# Patient Record
Sex: FEMALE | Race: OTHER | URBAN - METROPOLITAN AREA
[De-identification: names, ages, dates, MRNs, and addresses within clinical notes are randomized per-mention and may not be internally consistent; named-entity substitution may affect disease eponyms.]

---

## 2019-04-25 ENCOUNTER — OFFICE VISIT (OUTPATIENT)
Dept: FAMILY MEDICINE CLINIC | Age: 29
End: 2019-04-25

## 2019-04-25 ENCOUNTER — HOSPITAL ENCOUNTER (OUTPATIENT)
Dept: LAB | Age: 29
Discharge: HOME | End: 2019-04-25

## 2019-04-25 VITALS
SYSTOLIC BLOOD PRESSURE: 112 MMHG | BODY MASS INDEX: 22.97 KG/M2 | HEART RATE: 73 BPM | TEMPERATURE: 98 F | WEIGHT: 117 LBS | HEIGHT: 60 IN | DIASTOLIC BLOOD PRESSURE: 69 MMHG

## 2019-04-25 DIAGNOSIS — R42 DIZZINESS: Primary | ICD-10-CM

## 2019-04-25 DIAGNOSIS — R42 DIZZINESS: ICD-10-CM

## 2019-04-25 LAB
BASOPHILS # BLD: 0 K/UL (ref 0–0.1)
BASOPHILS NFR BLD: 0 % (ref 0–1)
DIFFERENTIAL METHOD BLD: NORMAL
EOSINOPHIL # BLD: 0.2 K/UL (ref 0–0.4)
EOSINOPHIL NFR BLD: 4 % (ref 0–7)
ERYTHROCYTE [DISTWIDTH] IN BLOOD BY AUTOMATED COUNT: 12.8 % (ref 11.5–14.5)
HCT VFR BLD AUTO: 41.9 % (ref 35–47)
HGB BLD-MCNC: 13.9 G/DL (ref 11.5–16)
HGB BLD-MCNC: 14.1 G/DL
IMM GRANULOCYTES # BLD AUTO: 0 K/UL (ref 0–0.04)
IMM GRANULOCYTES NFR BLD AUTO: 0 % (ref 0–0.5)
LYMPHOCYTES # BLD: 1.6 K/UL (ref 0.8–3.5)
LYMPHOCYTES NFR BLD: 28 % (ref 12–49)
MCH RBC QN AUTO: 28.8 PG (ref 26–34)
MCHC RBC AUTO-ENTMCNC: 33.2 G/DL (ref 30–36.5)
MCV RBC AUTO: 86.7 FL (ref 80–99)
MONOCYTES # BLD: 0.5 K/UL (ref 0–1)
MONOCYTES NFR BLD: 9 % (ref 5–13)
NEUTS SEG # BLD: 3.4 K/UL (ref 1.8–8)
NEUTS SEG NFR BLD: 59 % (ref 32–75)
NRBC # BLD: 0 K/UL (ref 0–0.01)
NRBC BLD-RTO: 0 PER 100 WBC
PLATELET # BLD AUTO: 211 K/UL (ref 150–400)
PMV BLD AUTO: 12.3 FL (ref 8.9–12.9)
RBC # BLD AUTO: 4.83 M/UL (ref 3.8–5.2)
WBC # BLD AUTO: 5.8 K/UL (ref 3.6–11)

## 2019-04-25 PROCEDURE — 80053 COMPREHEN METABOLIC PANEL: CPT

## 2019-04-25 PROCEDURE — 82306 VITAMIN D 25 HYDROXY: CPT

## 2019-04-25 PROCEDURE — 85025 COMPLETE CBC W/AUTO DIFF WBC: CPT

## 2019-04-25 PROCEDURE — 84443 ASSAY THYROID STIM HORMONE: CPT

## 2019-04-25 NOTE — PROGRESS NOTES
HISTORY OF PRESENT ILLNESS  Jonelle Turcios is a 29 y.o. female. HPI  Patient has been having dizzy spells for the past 2 months. Menstrual cycles are normal.    Has noticeds some sore throat, started yesterday. She is breastfeeding. Takes no medication. Denies problems with pollen. States the dizziness is not often, but has happened at least 3 times in the past 2 weeks and is for short periods of time when it happens    Review of Systems   Constitutional: Negative for chills, fever and weight loss. HENT: Positive for sore throat. Negative for ear pain, hearing loss and tinnitus. Eyes: Negative for blurred vision, double vision and photophobia. Respiratory: Negative for cough, shortness of breath and wheezing. Cardiovascular: Negative for chest pain and leg swelling. Gastrointestinal: Negative for abdominal pain, constipation, diarrhea, heartburn, nausea and vomiting. Genitourinary: Negative for dysuria, frequency and urgency. Musculoskeletal: Negative for back pain, myalgias and neck pain. Skin: Negative for itching and rash. Neurological: Positive for dizziness. /69 (BP 1 Location: Right arm, BP Patient Position: Sitting)   Pulse 73   Temp 98 °F (36.7 °C) (Oral)   Ht 4' 11.84\" (1.52 m)   Wt 117 lb (53.1 kg)   LMP 04/04/2019   BMI 22.97 kg/m²   Physical Exam   Constitutional: She is oriented to person, place, and time. She appears well-developed and well-nourished. HENT:   Head: Normocephalic. Right Ear: External ear normal.   Left Ear: External ear normal.   Throat erythema no exudates   Eyes: Pupils are equal, round, and reactive to light. Conjunctivae and EOM are normal.   Neck: Normal range of motion. Neck supple. Cardiovascular: Normal rate, regular rhythm and normal heart sounds. No murmur heard. Pulmonary/Chest: Effort normal and breath sounds normal. No respiratory distress. She has no wheezes. She has no rales. Abdominal: Soft.  Bowel sounds are normal. She exhibits no distension. There is no tenderness. There is no rebound. Musculoskeletal: Normal range of motion. She exhibits no edema. Neurological: She is alert and oriented to person, place, and time. She has normal reflexes. Skin: Skin is warm. No erythema. ASSESSMENT and PLAN  Diagnoses and all orders for this visit:    1. Dizziness  -     AMB POC HEMOGLOBIN (HGB)  -     CBC WITH AUTOMATED DIFF; Future  -     METABOLIC PANEL, COMPREHENSIVE; Future  -     TSH 3RD GENERATION; Future  -     VITAMIN D, 25 HYDROXY; Future      Dizziness, unclear reason, will check labs today.   Advised to stay well hydrated

## 2019-04-25 NOTE — PROGRESS NOTES
Results for orders placed or performed in visit on 04/25/19   AMB POC HEMOGLOBIN (HGB)   Result Value Ref Range    Hemoglobin (POC) 14.1

## 2019-04-26 LAB
25(OH)D3 SERPL-MCNC: 27.3 NG/ML (ref 30–100)
ALBUMIN SERPL-MCNC: 4.1 G/DL (ref 3.5–5)
ALBUMIN/GLOB SERPL: 1.3 {RATIO} (ref 1.1–2.2)
ALP SERPL-CCNC: 89 U/L (ref 45–117)
ALT SERPL-CCNC: 29 U/L (ref 12–78)
ANION GAP SERPL CALC-SCNC: 6 MMOL/L (ref 5–15)
AST SERPL-CCNC: 19 U/L (ref 15–37)
BILIRUB SERPL-MCNC: 0.5 MG/DL (ref 0.2–1)
BUN SERPL-MCNC: 10 MG/DL (ref 6–20)
BUN/CREAT SERPL: 16 (ref 12–20)
CALCIUM SERPL-MCNC: 8.9 MG/DL (ref 8.5–10.1)
CHLORIDE SERPL-SCNC: 107 MMOL/L (ref 97–108)
CO2 SERPL-SCNC: 27 MMOL/L (ref 21–32)
CREAT SERPL-MCNC: 0.63 MG/DL (ref 0.55–1.02)
GLOBULIN SER CALC-MCNC: 3.2 G/DL (ref 2–4)
GLUCOSE SERPL-MCNC: 75 MG/DL (ref 65–100)
POTASSIUM SERPL-SCNC: 3.9 MMOL/L (ref 3.5–5.1)
PROT SERPL-MCNC: 7.3 G/DL (ref 6.4–8.2)
SODIUM SERPL-SCNC: 140 MMOL/L (ref 136–145)
TSH SERPL DL<=0.05 MIU/L-ACNC: 1.2 UIU/ML (ref 0.36–3.74)

## 2019-04-29 NOTE — PROGRESS NOTES
Normal blood count, kidney function, liver function test and electrolytes, normal thyroid function. Vitamin D level is a little decreased.   She needs to get OTC vitamin D supplement and take about 1200 International Units a day  Patient can be informed

## 2019-07-30 ENCOUNTER — OFFICE VISIT (OUTPATIENT)
Dept: FAMILY MEDICINE CLINIC | Age: 29
End: 2019-07-30

## 2019-07-30 VITALS — WEIGHT: 127.4 LBS | TEMPERATURE: 98.1 F | BODY MASS INDEX: 25.01 KG/M2

## 2019-07-30 DIAGNOSIS — R21 RASH AND NONSPECIFIC SKIN ERUPTION: Primary | ICD-10-CM

## 2019-07-30 DIAGNOSIS — J02.9 SORE THROAT: ICD-10-CM

## 2019-07-30 RX ORDER — NAPROXEN 500 MG/1
500 TABLET ORAL 2 TIMES DAILY WITH MEALS
Qty: 10 TAB | Refills: 0 | Status: SHIPPED | OUTPATIENT
Start: 2019-07-30 | End: 2019-07-30

## 2019-07-30 RX ORDER — LORATADINE 10 MG/1
10 TABLET ORAL DAILY
Qty: 30 TAB | Refills: 0 | Status: SHIPPED | OUTPATIENT
Start: 2019-07-30

## 2019-07-30 NOTE — PROGRESS NOTES
Patient is breastfeeding-naproxen 500mg tablet rx was discontinue. Nurse called [address]  spoke with Dede Mobley and medication was verbally discontinue. Patient prescribed OTC Ibuprofen 200mg tablet, one tablet by mouth [id number] PRN. Reviewed AVS, prescription and pharmacy location with patient (loratidine). AVS printed and given. Patient to RTC if s/s worsen or fail to improve. This has been fully explained to the patient, who indicates understanding and agrees with plan. No further questions at this time.  Jennifer Alamo RN

## 2019-07-30 NOTE — PROGRESS NOTES
Assessment/Plan:       ICD-10-CM ICD-9-CM    1. Rash and nonspecific skin eruption R21 782.1 loratadine (CLARITIN) 10 mg tablet         [address], [address]  [address] [facility] [address]  Phone: [phone] Fax: [phone]      [facility]-  [address]  Subjective:     Chief Complaint   Patient presents with    Sore Throat     Pt explains that sore throat is very deep mostly in the morning and not when she swallows. Back + and chest pressure.  Rash     On legs x15 days. Very itchy. Opal Mitchell is a 29 y.o. OTHER female. HPI:   Sore throat - in the mornings. No problems swallowing liquids and solids. Also pain behind the shoulder. Took simethicone and loperamide to eliminate gases from  store. Feels dry mouty. How long? Associated chest pressure. Rash on the legs for 15 days, itchy. It is worse. No new creams or products. Started Vitamin D prescription a month ago. No hot tub. She used alcohol on the rash and it didn't help. Using an anti-astringent cream that is steroid free from [facility] containing aluminum acetate astringent providing temporary relief. She  has no past medical history on file. Review of Systems: Negative for: fever, chest pain, shortness of breath, leg swelling, exertional dyspnea, palpitations. Current Medications:   No current outpatient medications on file prior to visit. No current facility-administered medications on file prior to visit. Social History: She  reports that she has never smoked. She has never used smokeless tobacco. She reports that she drank alcohol. Objective:     Vitals:    07/30/19 1339   Temp: 98.1 °F (36.7 °C)   TempSrc: Oral   Weight: 127 lb 6.4 oz (57.8 kg)    No LMP recorded. Wt Readings from Last 2 Encounters:   07/30/19 127 lb 6.4 oz (57.8 kg)   04/25/19 117 lb (53.1 kg)   Weight gain noted.   No results found for any visits on 07/30/19. LMP July 8 - July 14. Physical Examination:  General appearance - well developed, no acute distress. Chest - clear to auscultation. Heart - regular rate and rhythm without murmurs, rubs, or gallops. Abdomen - bowel sounds present x 4, NT, ND. Extremities - pulses intact. No peripheral edema. Assessment/Plan:   Diagnoses and all orders for this visit:    1. Rash and nonspecific skin eruption  -     loratadine (CLARITIN) 10 mg tablet; Take 1 Tab by mouth daily. For itching; Yana 1 tab diario para picazon      She sleeps fine but occasionally the itching is too much at night. Kellie Rondon, NAVYA, FNP-BC, BC-ADM  Radha Bergeron expressed understanding of this plan.

## 2019-07-30 NOTE — PROGRESS NOTES
Coordination of Care  1. Have you been to the ER, urgent care clinic since your last visit? Hospitalized since your last visit? No    2. Have you seen or consulted any other health care providers outside of the [address] [place] [address] since your last visit? Include any pap smears or colon screening. No    Does the patient need refills? NO    Learning Assessment Complete?  yes  Depression Screening complete in the past 12 months? yes

## 2020-05-12 ENCOUNTER — OFFICE VISIT (OUTPATIENT)
Dept: FAMILY MEDICINE CLINIC | Age: 30
End: 2020-05-12

## 2020-05-12 DIAGNOSIS — R20.0 NUMBNESS AND TINGLING IN RIGHT HAND: Primary | ICD-10-CM

## 2020-05-12 DIAGNOSIS — R20.2 NUMBNESS AND TINGLING IN RIGHT HAND: Primary | ICD-10-CM

## 2020-05-12 RX ORDER — AMITRIPTYLINE HYDROCHLORIDE 10 MG/1
10 TABLET, FILM COATED ORAL
Qty: 7 TAB | Refills: 0 | Status: SHIPPED | OUTPATIENT
Start: 2020-05-12

## 2020-05-12 RX ORDER — NAPROXEN 500 MG/1
500 TABLET ORAL 2 TIMES DAILY WITH MEALS
Qty: 14 TAB | Refills: 0 | Status: SHIPPED | OUTPATIENT
Start: 2020-05-12

## 2020-05-12 NOTE — PROGRESS NOTES
Coordination of Care  1. Have you been to the ER, urgent care clinic since your last visit? Hospitalized since your last visit? No    2. Have you seen or consulted any other health care providers outside of the [address] [place] [address] since your last visit? Include any pap smears or colon screening. No    Does the patient need refills? N/A    Learning Assessment Complete?  yes

## 2020-05-12 NOTE — PROGRESS NOTES
Pat Suresh is a 34 y.o. female evaluated via telephone at [phone] on 5/12/2020. Patient identification verified with 2 identifiers. Consent: She and/or health care decision maker has provided verbal consent to proceed: Yes Pursuant to the emergency declaration under the [address] [facility], [address] [place] [address] and the [employer] and [employer] Act, this Virtual Telephone Visit was conducted to reduce the patient's risk of exposure to COVID-19. : Mark Tony  Chief Complaint   Patient presents with    Numbness     right hand     15 days since right hand numb. Middle and ring finger are numb all the time, day and night. Tingling. Wakes her up at night. 8 days ago the right hand woke up again but the 2 fingers in the middle are still numb. 20 days ago she scratched her wrist on a piece of plastic while taking food out of the freezer. Reports it has healed. It did not hurt at the time. States that her  strength in this hand is normal and she is not dropping items she picks up. Numbness   The history is provided by the patient. This is a recurrent (had this last summer but she would wake up to numb fingers) problem. The current episode started more than 1 week ago. The problem has not changed since onset. There was right upper extremity (right hand) focality noted. There has been no fever. There were no medications administered (has tried exercises but they don't help) prior to arrival.    Review of Systems   Neurological: Positive for numbness. Patient's last menstrual period was 04/27/2020. Documentation:  I communicated with the patient and/or health care decision maker about:  Diagnoses and all orders for this visit:    1. Numbness and tingling in right hand  -     naproxen (NAPROSYN) 500 mg tablet; Take 1 Tab by mouth two (2) times daily (with meals). For inflammation.   Yana 1 tab por boca 2 veces al mira con comida para inflamacion  -     amitriptyline (ELAVIL) 10 mg tablet; Take 1 Tab by mouth nightly. For numbness. Yana 1 tab antes de dormir para dormecido. Follow-up and Dispositions    · Return if symptoms worsen or fail to improve. Details of this discussion including any medical advice provided: Naproxen 500 mg bid and amitriptyline 10 mg po qhs for 7 days  Total Time: minutes: 11-20 minutes  I affirm this is a Patient Initiated Episode with a Patient who has not had a related appointment within my department in the past 7 days or scheduled within the next 24 hours.   Matt Wong, NP

## 2020-12-04 ENCOUNTER — VIRTUAL VISIT (OUTPATIENT)
Dept: FAMILY MEDICINE CLINIC | Age: 30
End: 2020-12-04

## 2020-12-04 DIAGNOSIS — Z30.09 ENCOUNTER FOR COUNSELING REGARDING CONTRACEPTION: Primary | ICD-10-CM

## 2020-12-04 PROCEDURE — 99441 PR PHYS/QHP TELEPHONE EVALUATION 5-10 MIN: CPT | Performed by: NURSE PRACTITIONER

## 2020-12-04 NOTE — PROGRESS NOTES
: Keke Sanchez  Patient identification verified with 2 identifiers. Consent: She and/or health care decision maker has provided verbal consent to proceed: Yes   Total Time: minutes: 5-10 minutes  Pursuant to the emergency declaration under the [address] [facility], [address] [place] [address] authority and the [employer] and [employer] Act, this Virtual Telephone Visit was conducted to reduce the patient's risk of exposure to COVID-19. Assessment:   Diagnoses and all orders for this visit:    1. Encounter for counseling regarding contraception      Plan:  I communicated with the patient and/or health care decision maker about the following:    -Will provide information on available resources for contraception    Nanci Monzon is a 27 y.o. female evaluated via telephone on 2020. Patient identification verified with 2 identifiers. Chief Complaint   Patient presents with   [address] [place] [address] Other     birth control questions     History of Present Illness  The patient has an implant that has (20) but she wants to continue with family planning  Three years ago in [facility] she had a contraceptive implant placed under her left arm called \"Implanto LXP\". The closest similar product available in the [facility] would be Nexplanon. Currently having the same symptoms she has had since the start of the contraceptive implant, including heavy bleeding and lower abdominal pain. Concerned because it is  and would like to continue contraception. No physical exam performed due to telephone visit. I affirm this is a Patient Initiated Episode with a Patient who has not had a related appointment within my department in the past 7 days or scheduled within the next 24 hours. Alonso Newman NP   Nanci Monzon expressed understanding and agreed to this plan.

## 2020-12-04 NOTE — PROGRESS NOTES
With the assistance of Jaylan Guzman, [facility] , gave the patient the [facility] Health Department of the [facility] Department number, [phone]. Instructed patient to speak clearly and slowly and leave her name and number in a voicemail. Pt also given the English name of her birth control implant, \"Nexplanon\". Pt verbalized understanding, denied having any further questions. Alexis Herrera.  [facility]

## 2020-12-04 NOTE — PROGRESS NOTES
Coordination of Care  1. Have you been to the ER, urgent care clinic since your last visit? Hospitalized since your last visit? No    2. Have you seen or consulted any other health care providers outside of the [address] [place] [address] since your last visit? Include any pap smears or colon screening. No    Does the patient need refills? NO    Learning Assessment Complete?  yes  Depression Screening complete in the past 12 months? yes     Pt has not vitals signs available    Pt is taking Vit D    The patient has an implant that has (20) but she wants to continue with family planning

## 2023-05-25 RX ORDER — LORATADINE 10 MG/1
10 TABLET ORAL DAILY
COMMUNITY
Start: 2019-07-30 | End: 2023-07-03

## 2023-05-25 RX ORDER — NAPROXEN 500 MG/1
500 TABLET ORAL 2 TIMES DAILY WITH MEALS
COMMUNITY
Start: 2020-05-12 | End: 2023-07-03

## 2023-05-25 RX ORDER — AMITRIPTYLINE HYDROCHLORIDE 10 MG/1
10 TABLET, FILM COATED ORAL
COMMUNITY
Start: 2020-05-12 | End: 2023-07-03

## 2023-07-24 ENCOUNTER — INITIAL PRENATAL (OUTPATIENT)
Age: 33
End: 2023-07-24

## 2023-07-24 VITALS
DIASTOLIC BLOOD PRESSURE: 71 MMHG | WEIGHT: 128.6 LBS | HEIGHT: 60 IN | BODY MASS INDEX: 25.25 KG/M2 | SYSTOLIC BLOOD PRESSURE: 119 MMHG

## 2023-07-24 DIAGNOSIS — Z78.9 LANGUAGE BARRIER: ICD-10-CM

## 2023-07-24 DIAGNOSIS — Z3A.09 9 WEEKS GESTATION OF PREGNANCY: Primary | ICD-10-CM

## 2023-07-24 DIAGNOSIS — Z01.419 ENCOUNTER FOR CERVICAL PAP SMEAR WITH PELVIC EXAM: ICD-10-CM

## 2023-07-24 PROBLEM — Z60.3 LANGUAGE BARRIER: Status: ACTIVE | Noted: 2023-07-24

## 2023-07-24 PROBLEM — Z75.8 LANGUAGE BARRIER: Status: ACTIVE | Noted: 2023-07-24

## 2023-07-24 PROCEDURE — 0502F SUBSEQUENT PRENATAL CARE: CPT | Performed by: OBSTETRICS & GYNECOLOGY

## 2023-07-24 NOTE — PROGRESS NOTES
Current pregnancy history:    Krys Ballesteros is a ,  28 y.o. female  / Mona Gandhis Patient's last menstrual period was 2023 (exact date). .  She presents for the evaluation of amenorrhea and a positive pregnancy test.    LMP history:  The date of her LMP is 8900 certain. Her last menstrual period was normal and lasted for 4 to 5 days. A urine pregnancy test was positive 3 weeks ago. She was not on the pill at conception. Based on her LMP, her EDC is 2024 and her EGA is 9 weeks,4 days. Her menstrual cycles are regular and occur approximately every 28 days and range from 3 to 5 days. The last menses lasted  the usual number of days. Ultrasound data:  She had an ultrasound done by the ultrasound tech today which revealed a viable turk pregnancy with a gestational age of 10 weeks and 1 days giving an [address] of 2024. TA ULTRASOUND PERFORMED  A SINGLE VIABLE 9W1D IUP IS SEEN WITH NORMAL CARDIAC RHYTHM. GESTATIONAL AGE BASED ON TODAY'S ULTRASOUND. A NORMAL YOLK SAC IS SEEN. RIGHT ADNEXA APPEARS WITHIN NORMAL LIMITS. LEFT ADNEXA APPEARS WITHIN NORMAL LIMITS. NO FREE FLUID SEEN IN THE CDS. Pregnancy symptoms:     Since her LMP she has experienced urinary frequency, breast tenderness, and nausea. She has not been vomiting over the last few weeks. Associated signs and symptoms which she denies: dysuria, discharge, vaginal bleeding. She states she has gained weight:  not sure how much over the last few weeks. Relevant past pregnancy history:              She has the following pregnancy history:                She has no history of  delivery. Relevant past medical history:(relevant to this pregnancy): noncontributory. Her occupation is: homemaker. Substance history: negative for alcohol, tobacco and street drugs. Positive for nothing. Exposure history: There is/are no indoor cat/s in the home.   The patient was instructed to

## 2023-07-24 NOTE — PROGRESS NOTES
Naye Cordero is a 28 y.o. female presents for a new pregnancy visit. Chief Complaint   Patient presents with    Initial Prenatal Visit          Patient's last menstrual period was 2023 (exact date). Last Pap: not  obtained. LMP history:  The date of her LMP is  certain. Her last menstrual period was normal and lasted for 4 to 5 days. A urine pregnancy test was positive 3 weeks ago. She was not on the pill at conception. Based on her LMP, her EDC is 2024 and her EGA is 9 weeks,4 days. Her menstrual cycles are regular and occur approximately every 28 days and range from 3 to 5 days. The last menses lasted  the usual number of days. Ultrasound data:  She had an ultrasound done by the ultrasound tech today which revealed a viable turk pregnancy with a gestational age of 10 weeks and 1 days giving an [address] of 2024. TA ULTRASOUND PERFORMED  A SINGLE VIABLE 9W1D IUP IS SEEN WITH NORMAL CARDIAC RHYTHM. GESTATIONAL AGE BASED ON TODAY'S ULTRASOUND. A NORMAL YOLK SAC IS SEEN. RIGHT ADNEXA APPEARS WITHIN NORMAL LIMITS. LEFT ADNEXA APPEARS WITHIN NORMAL LIMITS. NO FREE FLUID SEEN IN THE CDS. Pregnancy symptoms:    Since her LMP she has experienced urinary frequency, breast tenderness, and nausea. She has not been vomiting over the last few weeks. Associated signs and symptoms which she denies: dysuria, discharge, vaginal bleeding. She states she has gained weight:  Approximately 5 pounds over the last few weeks. Relevant past pregnancy history:   She has the following pregnancy history:     She has no history of  delivery. Relevant past medical history:(relevant to this pregnancy): noncontributory. Her occupation is: homemaker. 1. Have you been to the ER, urgent care clinic, or hospitalized since your last visit? N/A NP    2.  Have you seen or consulted any other health care providers outside of the [address] [place] [address] Avenue since your

## 2023-07-25 LAB
25(OH)D3+25(OH)D2 SERPL-MCNC: 30.4 NG/ML (ref 30–100)
ABO GROUP BLD: NORMAL
ABO, EXTERNAL RESULT: NORMAL
BLD GP AB SCN SERPL QL: NEGATIVE
ERYTHROCYTE [DISTWIDTH] IN BLOOD BY AUTOMATED COUNT: 13.2 % (ref 11.7–15.4)
HBA1C MFR BLD: 5.1 % (ref 4.8–5.6)
HBV SURFACE AG SERPL QL IA: NEGATIVE
HCT VFR BLD AUTO: 40.2 % (ref 34–46.6)
HCV IGG SERPL QL IA: NON REACTIVE
HEP B, EXTERNAL RESULT: NEGATIVE
HEPATITIS C ANTIBODY, EXTERNAL RESULT: NEGATIVE
HGB BLD-MCNC: 13.6 G/DL (ref 11.1–15.9)
HIV 1+2 AB+HIV1 P24 AG SERPL QL IA: NON REACTIVE
HIV, EXTERNAL RESULT: NEGATIVE
MCH RBC QN AUTO: 28.2 PG (ref 26.6–33)
MCHC RBC AUTO-ENTMCNC: 33.8 G/DL (ref 31.5–35.7)
MCV RBC AUTO: 83 FL (ref 79–97)
PLATELET # BLD AUTO: 252 X10E3/UL (ref 150–450)
RBC # BLD AUTO: 4.82 X10E6/UL (ref 3.77–5.28)
RH BLD: POSITIVE
RPR SER QL: NON REACTIVE
RPR, EXTERNAL RESULT: NON REACTIVE
RUBELLA TITER, EXTERNAL RESULT: NORMAL
WBC # BLD AUTO: 8.7 X10E3/UL (ref 3.4–10.8)

## 2023-07-26 LAB — RUBV IGG SERPL IA-ACNC: 2.89 INDEX

## 2023-08-21 ENCOUNTER — ROUTINE PRENATAL (OUTPATIENT)
Age: 33
End: 2023-08-21

## 2023-08-21 VITALS — SYSTOLIC BLOOD PRESSURE: 132 MMHG | BODY MASS INDEX: 25.52 KG/M2 | DIASTOLIC BLOOD PRESSURE: 75 MMHG | WEIGHT: 130 LBS

## 2023-08-21 DIAGNOSIS — Z3A.13 13 WEEKS GESTATION OF PREGNANCY: Primary | ICD-10-CM

## 2023-08-21 PROCEDURE — 0502F SUBSEQUENT PRENATAL CARE: CPT | Performed by: OBSTETRICS & GYNECOLOGY

## 2023-08-21 NOTE — PROGRESS NOTES
OB VISIT      Current EGA:   13w4d    Visit Notes:  Doing Well. No Ucs. No VB. Pain in teeth & lower back. Total weight gain is 2 lb (0.907 kg). (Total expected for pregnancy is 15 lb (7 kg)-25 lb (11.5 kg))  Last Weight Metrics:  Weight Loss Metrics 8/21/2023 7/24/2023 7/3/2023   Height - 4' 11.84\" 4' 11.843\"   Weight 130 lbs 128 lbs 10 oz 128 lbs   BMI (Calculated) 0 kg/m2 25.3 kg/m2 25.2 kg/m2        Vitals:    08/21/23 1352   BP: 132/75        Problem List:  Dating by LMP = 9 wk US  Language Barrier (Frisian)    Current Outpatient Medications   Medication Instructions    Prenatal Vit-Fe Fumarate-FA (PRENATAL VITAMINS PO) Oral        Visit Diagnoses:   Diagnosis Orders   1. 13 weeks gestation of pregnancy  Panorama Prenatal Test Full Panel          Follow Up:  Return in about 4 weeks (around 9/18/2023) for Routine OB.   For NIPT today  See Dentist  Melquiades Cavazos MD, FACOG  08/21/23  1:59 PM

## 2023-08-31 ENCOUNTER — TELEPHONE (OUTPATIENT)
Age: 33
End: 2023-08-31

## 2023-08-31 LAB
Lab: NORMAL
NTRA 1P36 DELETION SYNDROME POPULATION-BASED RISK TEXT: NORMAL
NTRA 1P36 DELETION SYNDROME RESULT TEXT: NORMAL
NTRA 1P36 DELETION SYNDROME RISK SCORE TEXT: NORMAL
NTRA 22Q11.2 DELETION SYNDROME POPULATION-BASED RISK TEXT: NORMAL
NTRA 22Q11.2 DELETION SYNDROME RESULT TEXT: NORMAL
NTRA 22Q11.2 DELETION SYNDROME RISK SCORE TEXT: NORMAL
NTRA ANGELMAN SYNDROME POPULATION-BASED RISK TEXT: NORMAL
NTRA ANGELMAN SYNDROME RESULT TEXT: NORMAL
NTRA ANGELMAN SYNDROME RISK SCORE TEXT: NORMAL
NTRA CRI-DU-CHAT SYNDROME POPULATION-BASED RISK TEXT: NORMAL
NTRA CRI-DU-CHAT SYNDROME RESULT TEXT: NORMAL
NTRA CRI-DU-CHAT SYNDROME RISK SCORE TEXT: NORMAL
NTRA FETAL FRACTION: NORMAL
NTRA GENDER OF FETUS: NORMAL
NTRA MONOSOMY X AGE-BASED RISK TEXT: NORMAL
NTRA MONOSOMY X RESULT TEXT: NORMAL
NTRA MONOSOMY X RISK SCORE TEXT: NORMAL
NTRA PRADER-WILLI SYNDROME POPULATION-BASED RISK TEXT: NORMAL
NTRA PRADER-WILLI SYNDROME RESULT TEXT: NORMAL
NTRA PRADER-WILLI SYNDROME RISK SCORE TEXT: NORMAL
NTRA TRIPLOIDY RESULT TEXT: NORMAL
NTRA TRISOMY 13 AGE-BASED RISK TEXT: NORMAL
NTRA TRISOMY 13 RESULT TEXT: NORMAL
NTRA TRISOMY 13 RISK SCORE TEXT: NORMAL
NTRA TRISOMY 18 AGE-BASED RISK TEXT: NORMAL
NTRA TRISOMY 18 RESULT TEXT: NORMAL
NTRA TRISOMY 18 RISK SCORE TEXT: NORMAL
NTRA TRISOMY 21 AGE-BASED RISK TEXT: NORMAL
NTRA TRISOMY 21 RESULT TEXT: NORMAL
NTRA TRISOMY 21 RISK SCORE TEXT: NORMAL

## 2023-08-31 NOTE — TELEPHONE ENCOUNTER
Per Dr. Shanice Noel and spoke with patient regarding recent NIPT lab results. Per MD, Your NIPT genetic test for Down's syndrome and others (Trisomy 21, 18 & 13) was read as normal (Low Risk). Gender was not revealed. Pt wanted to pickup results in an envelope. This MA printed results and placed at the  for pu, per pt req. Patient verbalized understanding and has no questions at this time.

## 2023-09-18 ENCOUNTER — ROUTINE PRENATAL (OUTPATIENT)
Age: 33
End: 2023-09-18

## 2023-09-18 VITALS — DIASTOLIC BLOOD PRESSURE: 73 MMHG | SYSTOLIC BLOOD PRESSURE: 119 MMHG | BODY MASS INDEX: 26.27 KG/M2 | WEIGHT: 133.8 LBS

## 2023-09-18 DIAGNOSIS — Z3A.17 17 WEEKS GESTATION OF PREGNANCY: Primary | ICD-10-CM

## 2023-09-18 PROCEDURE — 0502F SUBSEQUENT PRENATAL CARE: CPT | Performed by: OBSTETRICS & GYNECOLOGY

## 2023-09-18 NOTE — PROGRESS NOTES
OB VISIT      Current EGA:   17w4d    Visit Notes:  Doing Well. No Ucs. No LOF or VB. A little nausea. Total weight gain is 5 lb 12.8 oz (2.631 kg). (Total expected for pregnancy is 15 lb (7 kg)-25 lb (11.5 kg))  Last Weight Metrics:      9/18/2023     2:15 PM 8/21/2023     1:52 PM 7/24/2023     3:50 PM 7/3/2023    11:52 AM   Weight Loss Metrics   Height   4' 11.84\" 4' 11.843\"   Weight - Scale 133 lbs 13 oz 130 lbs 128 lbs 10 oz 128 lbs   BMI (Calculated) 0 kg/m2 0 kg/m2 25.3 kg/m2 25.2 kg/m2        Vitals:    09/18/23 1415   BP: 119/73   Weight: 133 lb 12.8 oz (60.7 kg)        Problem List:  Dating by LMP = 9 wk US  Language Barrier (Cameroonian)  Boy    Current Outpatient Medications   Medication Instructions    Prenatal Vit-Fe Fumarate-FA (PRENATAL VITAMINS PO) Oral        Visit Diagnoses:   Diagnosis Orders   1. 17 weeks gestation of pregnancy            Follow Up:  Return in about 3 weeks (around 10/9/2023) for Routine OB, OB Ultrasound.     Cristy Wilkes MD, [address] [facility]  09/18/23  2:29 PM

## 2023-09-21 LAB
AFP INTERP SERPL-IMP: NORMAL
AFP INTERP SERPL-IMP: NORMAL
AFP MOM SERPL: 1.13
AFP SERPL-MCNC: 51.1 NG/ML
AGE AT DELIVERY: 33.3 YR
COMMENT: NORMAL
GA METHOD: NORMAL
GA: 17.6 WEEKS
IDDM PATIENT QL: NO
Lab: NORMAL
MULTIPLE PREGNANCY: NO
NEURAL TUBE DEFECT RISK FETUS: 8106 %

## 2023-10-05 ENCOUNTER — OFFICE VISIT (OUTPATIENT)
Age: 33
End: 2023-10-05

## 2023-10-05 ENCOUNTER — HOSPITAL ENCOUNTER (OUTPATIENT)
Facility: HOSPITAL | Age: 33
Setting detail: SPECIMEN
Discharge: HOME | End: 2023-10-08

## 2023-10-05 VITALS
WEIGHT: 136 LBS | SYSTOLIC BLOOD PRESSURE: 123 MMHG | HEIGHT: 60 IN | TEMPERATURE: 98.2 F | BODY MASS INDEX: 26.7 KG/M2 | HEART RATE: 91 BPM | DIASTOLIC BLOOD PRESSURE: 68 MMHG | OXYGEN SATURATION: 97 %

## 2023-10-05 DIAGNOSIS — R21 RASH AND NONSPECIFIC SKIN ERUPTION: Primary | ICD-10-CM

## 2023-10-05 LAB
ALBUMIN SERPL-MCNC: 2.8 G/DL (ref 3.5–5)
ALBUMIN/GLOB SERPL: 0.8 (ref 1.1–2.2)
ALP SERPL-CCNC: 50 U/L (ref 45–117)
ALT SERPL-CCNC: 23 U/L (ref 12–78)
ANION GAP SERPL CALC-SCNC: 6 MMOL/L (ref 5–15)
AST SERPL-CCNC: 12 U/L (ref 15–37)
BILIRUB SERPL-MCNC: 0.2 MG/DL (ref 0.2–1)
BUN SERPL-MCNC: 8 MG/DL (ref 6–20)
BUN/CREAT SERPL: 15 (ref 12–20)
CALCIUM SERPL-MCNC: 9 MG/DL (ref 8.5–10.1)
CHLORIDE SERPL-SCNC: 108 MMOL/L (ref 97–108)
CO2 SERPL-SCNC: 24 MMOL/L (ref 21–32)
CREAT SERPL-MCNC: 0.54 MG/DL (ref 0.55–1.02)
GLOBULIN SER CALC-MCNC: 3.5 G/DL (ref 2–4)
GLUCOSE SERPL-MCNC: 83 MG/DL (ref 65–100)
POTASSIUM SERPL-SCNC: 3.9 MMOL/L (ref 3.5–5.1)
PROT SERPL-MCNC: 6.3 G/DL (ref 6.4–8.2)
SODIUM SERPL-SCNC: 138 MMOL/L (ref 136–145)

## 2023-10-05 PROCEDURE — 80053 COMPREHEN METABOLIC PANEL: CPT

## 2023-10-05 PROCEDURE — 36415 COLL VENOUS BLD VENIPUNCTURE: CPT

## 2023-10-05 PROCEDURE — 99213 OFFICE O/P EST LOW 20 MIN: CPT | Performed by: PHYSICIAN ASSISTANT

## 2023-10-05 SDOH — ECONOMIC STABILITY: FOOD INSECURITY: WITHIN THE PAST 12 MONTHS, THE FOOD YOU BOUGHT JUST DIDN'T LAST AND YOU DIDN'T HAVE MONEY TO GET MORE.: NEVER TRUE

## 2023-10-05 SDOH — ECONOMIC STABILITY: INCOME INSECURITY: IN THE LAST 12 MONTHS, WAS THERE A TIME WHEN YOU WERE NOT ABLE TO PAY THE MORTGAGE OR RENT ON TIME?: NO

## 2023-10-05 SDOH — ECONOMIC STABILITY: HOUSING INSECURITY
IN THE LAST 12 MONTHS, WAS THERE A TIME WHEN YOU DID NOT HAVE A STEADY PLACE TO SLEEP OR SLEPT IN A SHELTER (INCLUDING NOW)?: NO

## 2023-10-05 SDOH — ECONOMIC STABILITY: FOOD INSECURITY: WITHIN THE PAST 12 MONTHS, YOU WORRIED THAT YOUR FOOD WOULD RUN OUT BEFORE YOU GOT MONEY TO BUY MORE.: NEVER TRUE

## 2023-10-05 SDOH — ECONOMIC STABILITY: HOUSING INSECURITY: IN THE LAST 12 MONTHS, HOW MANY PLACES HAVE YOU LIVED?: 2

## 2023-10-05 ASSESSMENT — LIFESTYLE VARIABLES
HOW MANY STANDARD DRINKS CONTAINING ALCOHOL DO YOU HAVE ON A TYPICAL DAY: PATIENT DOES NOT DRINK
HOW OFTEN DO YOU HAVE A DRINK CONTAINING ALCOHOL: NEVER

## 2023-10-05 ASSESSMENT — PATIENT HEALTH QUESTIONNAIRE - PHQ9
SUM OF ALL RESPONSES TO PHQ9 QUESTIONS 1 & 2: 0
2. FEELING DOWN, DEPRESSED OR HOPELESS: 0
SUM OF ALL RESPONSES TO PHQ QUESTIONS 1-9: 0
SUM OF ALL RESPONSES TO PHQ QUESTIONS 1-9: 0
1. LITTLE INTEREST OR PLEASURE IN DOING THINGS: 0
SUM OF ALL RESPONSES TO PHQ QUESTIONS 1-9: 0
SUM OF ALL RESPONSES TO PHQ QUESTIONS 1-9: 0

## 2023-10-05 NOTE — PROGRESS NOTES
Assessment/Plan:    Paulette Catalan was seen today for rash. Diagnoses and all orders for this visit:    Rash and nonspecific skin eruption  -     Comprehensive Metabolic Panel; Future      Check labs for PUPP  Continue OTC rash cream as needed  Pt to call her OB for f/up appt due to the sxs  She looks well and is not ill appearing but the rash is on her chest, abdomen, trunk, back and lower extremities so does not fit a patter for contact dermatitis  The rash is papular and pink     No follow-up provider specified. ALLA Orta expressed understanding of this plan. An AVS was printed and given to the patient.      ----------------------------------------------------------------------    Chief Complaint   Patient presents with    Rash     Patient c/o itchy rash all over body x15 days. History of Present Illness:  20 week pregnant pt presents for 2 weeks with itchy rash covering most of her body from chest down to lower extremities  She has not been ill and has no fever or URI sxs  She has been using OTC cream for the \"itching\" but it is not resolving  She had recent hepatitis testing which were all normal  She has + fetal movements. This is her second pregnancy and she did not have any complications with her first pregnancy      No past medical history on file. Current Outpatient Medications   Medication Sig Dispense Refill    Prenatal Vit-Fe Fumarate-FA (PRENATAL VITAMINS PO) Take by mouth       No current facility-administered medications for this visit.        Allergies   Allergen Reactions    [place] Rash       Social History     Tobacco Use    Smoking status: Never     Passive exposure: Never    Smokeless tobacco: Never   Substance Use Topics    Alcohol use: Not Currently    Drug use: Never       Family History   Problem Relation Age of Onset    Heart Attack Father     Kidney Disease Father        Physical Exam:     /68 (Site: Left Upper Arm, Position:

## 2023-10-05 NOTE — PROGRESS NOTES
[place] seen at discharge. Full name and  verified; After visit Summary was given. RN reviewed today's visit with patient, as well as instructions on when it is recommended to return for follow-up visit. RN reviewed the provider's instructions with the patient, answering all questions to her satisfaction. Patient verbalized understanding.   Zana Gifford RN

## 2023-10-05 NOTE — PROGRESS NOTES
Coordination of Care  1. Have you been to the ER, urgent care clinic since your last visit? Hospitalized since your last visit? No    2. Have you seen or consulted any other health care providers outside of the [address] [place] [address] since your last visit? Include any pap smears or colon screening. Yes When: [facility] family medicine, prenatal care  Does the patient need refills? No    Learning Assessment Complete?  yes  Depression Screening complete in the past 12 months? yes

## 2023-10-06 ENCOUNTER — CLINICAL DOCUMENTATION (OUTPATIENT)
Age: 33
End: 2023-10-06

## 2023-10-06 ENCOUNTER — TELEPHONE (OUTPATIENT)
Age: 33
End: 2023-10-06

## 2023-10-06 NOTE — TELEPHONE ENCOUNTER
Two patient identifiers used    28year old  20w1d pregnant. Patient is [place]wana    Aden Credit PA with [facility] saw the patient yesterday for a salmon colored macular /papular rash from the neck down that the patient reports she has had for over a week and has gotten worse and very itchy .  Patient is having trouble sleeping        PA had labs done, see chart for visit notes and lab results        PA is wondering how to proceed    Patient is using benadryl cream      PA can be reached at .[phone]      Please advise

## 2023-10-06 NOTE — TELEPHONE ENCOUNTER
Rajan Osullivan MD  to Me        10/6/23 10:51 AM  Can also take oral benadryl at night for sleep.   Should see PCP  I am not a dermatologist     Session code [id number]   Shay Guajardo #[id number] assisted with communication    Patient advised of Md recommendations and  verbalized understanding and will call back if no relief in her symptoms

## 2023-10-06 NOTE — PROGRESS NOTES
Telephone call to pt today to discuss the lab results from yesterday's visit for \"all over rash\"  She was identified by name and   She was given the neg results of her lab work and she states that she was not able to schedule a sooner appt with her  OB bc of the language barrier. She reports that she continues to be very uncomfortable due to the rash. I offered to call her OB's office to speak to a nurse to explain my findings and ask for a sooner appt or recs from the OB. I spoke to [facility], triage nurse, and she will get in touch with Dr Marisol Caro. I have given my personal cell so that he can reach me if he would like to discuss her sxs and findings.

## 2023-10-12 ENCOUNTER — ROUTINE PRENATAL (OUTPATIENT)
Age: 33
End: 2023-10-12

## 2023-10-12 VITALS — WEIGHT: 139.2 LBS | DIASTOLIC BLOOD PRESSURE: 71 MMHG | BODY MASS INDEX: 27.26 KG/M2 | SYSTOLIC BLOOD PRESSURE: 116 MMHG

## 2023-10-12 DIAGNOSIS — Z3A.21 21 WEEKS GESTATION OF PREGNANCY: Primary | ICD-10-CM

## 2023-10-12 DIAGNOSIS — R21 RASH AND NONSPECIFIC SKIN ERUPTION: ICD-10-CM

## 2023-10-12 PROCEDURE — 0502F SUBSEQUENT PRENATAL CARE: CPT | Performed by: OBSTETRICS & GYNECOLOGY

## 2023-10-12 RX ORDER — TRIAMCINOLONE ACETONIDE 5 MG/G
OINTMENT TOPICAL
Qty: 30 G | Refills: 1 | Status: SHIPPED | OUTPATIENT
Start: 2023-10-12

## 2023-10-12 NOTE — PROGRESS NOTES
OB VISIT      Current EGA:   21w0d    Visit Notes:  Doing Well. +Fetal Movement. No Ucs. No LOF or VB. Ultrasound done today all wnl 56%  STANDARD FETAL ANATOMIC SURVEY  A SINGLE VIABLE IUP AT 21W0D GA BY LMP IS SEEN. FETAL CARDIAC MOTION OBSERVED. FETAL ANATOMY WELL VISUALIZED AND APPEARS WITHIN NORMAL LIMITS. NO ABNORMALITIES ARE SEEN ON TODAY'S EXAM.  APPROPRIATE FETAL GROWTH IS SEEN. SIZE=DATES. JENNIFER, CERVIX AND PLACENTA APPEAR WITHIN NORMAL LIMITS. GENDER: XY    Had an allergic reaction to something outside its on leg julia left chest and lower arms. Worse on leg. Total weight gain is 11 lb 3.2 oz (5.08 kg). (Total expected for pregnancy is 15 lb (7 kg)-25 lb (11.5 kg))  Last Weight Metrics:      10/12/2023    11:25 AM 10/5/2023     8:49 AM 9/18/2023     2:15 PM 8/21/2023     1:52 PM 7/24/2023     3:50 PM 7/3/2023    11:52 AM   Weight Loss Metrics   Height  4' 11.921\"   4' 11.84\" 4' 11.843\"   Weight - Scale 139 lbs 3 oz 136 lbs 133 lbs 13 oz 130 lbs 128 lbs 10 oz 128 lbs   BMI (Calculated) 0 kg/m2 26.7 kg/m2 0 kg/m2 0 kg/m2 25.3 kg/m2 25.2 kg/m2        Vitals:    10/12/23 1125   BP: 116/71   Weight: 139 lb 3.2 oz (63.1 kg)        Problem List:  Dating by LMP = 9 wk [place] ([place])  Boy   21w0d = 21w3d growth 56% had    PN Flowsheet Data:  Fundal Height (cm): 22 cm  Prenatal Fetal Information  Fundal Height (cm): 22 cm  Fetal HR: U/S   Movement: Present   Comments: already voided rash over body with itching       Current Outpatient Medications   Medication Instructions    Prenatal Vit-Fe Fumarate-FA (PRENATAL VITAMINS PO) Oral    triamcinolone (ARISTOCORT) 0.5 % ointment Apply topically 2 times daily. Visit Diagnoses:   Diagnosis Orders   1. 21 weeks gestation of pregnancy        2. Rash and nonspecific skin eruption            Follow Up:  Return in about 4 weeks (around 11/9/2023) for Routine OB.   Triamcinolone 0.5%  Melquiades Cavazos MD, FACOG  10/12/23  12:04 PM

## 2023-11-10 ENCOUNTER — ROUTINE PRENATAL (OUTPATIENT)
Age: 33
End: 2023-11-10

## 2023-11-10 VITALS — SYSTOLIC BLOOD PRESSURE: 110 MMHG | WEIGHT: 142.4 LBS | DIASTOLIC BLOOD PRESSURE: 71 MMHG | BODY MASS INDEX: 27.88 KG/M2

## 2023-11-10 DIAGNOSIS — Z3A.25 25 WEEKS GESTATION OF PREGNANCY: Primary | ICD-10-CM

## 2023-11-10 PROCEDURE — 0502F SUBSEQUENT PRENATAL CARE: CPT | Performed by: OBSTETRICS & GYNECOLOGY

## 2023-11-10 NOTE — PROGRESS NOTES
OB VISIT      Current EGA:   25w1d    Visit Notes:  Doing Well. +Fetal Movement. No Ucs. No LOF or VB. Total weight gain is 5.08 kg (11 lb 3.2 oz). (Total expected for pregnancy is 7 kg (15 lb)-11.5 kg (25 lb))  Last Weight Metrics:      10/12/2023    11:25 AM 10/5/2023     8:49 AM 9/18/2023     2:15 PM 8/21/2023     1:52 PM 7/24/2023     3:50 PM 7/3/2023    11:52 AM   Weight Loss Metrics   Height  4' 11.921\"   4' 11.84\" 4' 11.843\"   Weight - Scale 139 lbs 3 oz 136 lbs 133 lbs 13 oz 130 lbs 128 lbs 10 oz 128 lbs   BMI (Calculated) 0 kg/m2 26.7 kg/m2 0 kg/m2 0 kg/m2 25.3 kg/m2 25.2 kg/m2        There were no vitals filed for this visit. Problem List:  Dating by LMP = 9 wk [place] ([place])  Boy  US 21w0d = 21w3d growth 56% had       PN Flowsheet Data:              Current Outpatient Medications   Medication Instructions    Prenatal Vit-Fe Fumarate-FA (PRENATAL VITAMINS PO) Oral    triamcinolone (ARISTOCORT) 0.5 % ointment Apply topically 2 times daily. Visit Diagnoses:   Diagnosis Orders   1. 25 weeks gestation of pregnancy            Follow Up:  Return in about 3 weeks (around 12/1/2023) for Routine OB, Glucola.     Crisyt Wilkes MD, [id number] [facility]  11/10/23  11:27 AM

## 2023-12-01 ENCOUNTER — ROUTINE PRENATAL (OUTPATIENT)
Age: 33
End: 2023-12-01
Payer: MEDICAID

## 2023-12-01 VITALS — BODY MASS INDEX: 28.71 KG/M2 | DIASTOLIC BLOOD PRESSURE: 67 MMHG | SYSTOLIC BLOOD PRESSURE: 125 MMHG | WEIGHT: 146.6 LBS

## 2023-12-01 DIAGNOSIS — Z3A.28 28 WEEKS GESTATION OF PREGNANCY: Primary | ICD-10-CM

## 2023-12-01 DIAGNOSIS — Z23 NEED FOR TDAP VACCINATION: ICD-10-CM

## 2023-12-01 PROCEDURE — 0502F SUBSEQUENT PRENATAL CARE: CPT | Performed by: OBSTETRICS & GYNECOLOGY

## 2023-12-01 PROCEDURE — 90715 TDAP VACCINE 7 YRS/> IM: CPT | Performed by: OBSTETRICS & GYNECOLOGY

## 2023-12-01 PROCEDURE — 90471 IMMUNIZATION ADMIN: CPT | Performed by: OBSTETRICS & GYNECOLOGY

## 2023-12-01 NOTE — PROGRESS NOTES
OB VISIT      Current EGA:   28w1d    Visit Notes:  Doing Well. +Fetal Movement. No Ucs. No LOF or VB. For Glucola    Total weight gain is 8.437 kg (18 lb 9.6 oz). (Total expected for pregnancy is 7 kg (15 lb)-11.5 kg (25 lb))  Last Weight Metrics:      12/1/2023    11:44 AM 11/10/2023    11:43 AM 10/12/2023    11:25 AM 10/5/2023     8:49 AM 9/18/2023     2:15 PM 8/21/2023     1:52 PM 7/24/2023     3:50 PM   Weight Loss Metrics   Height    4' 11.921\"   4' 11.84\"   Weight - Scale 146 lbs 10 oz 142 lbs 6 oz 139 lbs 3 oz 136 lbs 133 lbs 13 oz 130 lbs 128 lbs 10 oz   BMI (Calculated) 0 kg/m2 0 kg/m2 0 kg/m2 26.7 kg/m2 0 kg/m2 0 kg/m2 25.3 kg/m2      Vitals:    12/01/23 1144   BP: 125/67   Weight: 66.5 kg (146 lb 9.6 oz)        Problem List:  Dating by LMP = 9 wk [place] ([id number] [address])  Boy \"[place]\"  US 21w0d = 21w3d growth 56% had       PN Flowsheet Data:  Prenatal Fetal Information  Fetal HR: 155  Movement: Present   Comments: glucola cbc tdap doing well already voidedd       Current Outpatient Medications   Medication Instructions    Prenatal Vit-Fe Fumarate-FA (PRENATAL VITAMINS PO) Oral    triamcinolone (ARISTOCORT) 0.5 % ointment Apply topically 2 times daily. Visit Diagnoses:   Diagnosis Orders   1. 28 weeks gestation of pregnancy  Glucose Tolerance Gestational, 1 Hour    CBC    Tdap, BOOSTRIX, (age 8 yrs+), IM      2. Need for Tdap vaccination  Tdap, BOOSTRIX, (age 8 yrs+), IM          Follow Up:  Return in about 3 weeks (around 12/22/2023) for Routine OB.     Bernadette Tamez MD, [id number] [facility]  12/01/23  11:48 AM

## 2023-12-02 LAB
ERYTHROCYTE [DISTWIDTH] IN BLOOD BY AUTOMATED COUNT: 13.2 % (ref 11.5–14.5)
GLUCOSE 1H P 100 G GLC PO SERPL-MCNC: 137 MG/DL (ref 65–140)
HCT VFR BLD AUTO: 36.6 % (ref 35–47)
HGB BLD-MCNC: 12.4 G/DL (ref 11.5–16)
MCH RBC QN AUTO: 27.8 PG (ref 26–34)
MCHC RBC AUTO-ENTMCNC: 33.9 G/DL (ref 30–36.5)
MCV RBC AUTO: 82.1 FL (ref 80–99)
NRBC # BLD: 0 K/UL (ref 0–0.01)
NRBC BLD-RTO: 0 PER 100 WBC
PLATELET # BLD AUTO: 226 K/UL (ref 150–400)
PMV BLD AUTO: 11.8 FL (ref 8.9–12.9)
RBC # BLD AUTO: 4.46 M/UL (ref 3.8–5.2)
WBC # BLD AUTO: 8.1 K/UL (ref 3.6–11)

## 2024-01-05 ENCOUNTER — ROUTINE PRENATAL (OUTPATIENT)
Age: 34
End: 2024-01-05

## 2024-01-05 VITALS — BODY MASS INDEX: 30 KG/M2 | WEIGHT: 153.2 LBS | SYSTOLIC BLOOD PRESSURE: 123 MMHG | DIASTOLIC BLOOD PRESSURE: 75 MMHG

## 2024-01-05 DIAGNOSIS — Z3A.33 33 WEEKS GESTATION OF PREGNANCY: Primary | ICD-10-CM

## 2024-01-05 NOTE — PROGRESS NOTES
OB VISIT      Current EGA:   33w1d    Visit Notes:  Doing Well. +Fetal Movement.  No Ucs. No LOF or VB.     Total weight gain is 11.4 kg (25 lb 3.2 oz).   (Total expected for pregnancy is 7 kg (15 lb)-11.5 kg (25 lb))  Last Weight Metrics:      1/5/2024    11:04 AM 12/22/2023    10:24 AM 12/1/2023    11:44 AM 11/10/2023    11:43 AM 10/12/2023    11:25 AM 10/5/2023     8:49 AM 9/18/2023     2:15 PM   Weight Loss Metrics   Height      4' 11.921\"    Weight - Scale 153 lbs 3 oz 150 lbs 146 lbs 10 oz 142 lbs 6 oz 139 lbs 3 oz 136 lbs 133 lbs 13 oz   BMI (Calculated) 0 kg/m2 0 kg/m2 0 kg/m2 0 kg/m2 0 kg/m2 26.7 kg/m2 0 kg/m2        Vitals:    01/05/24 1104   BP: 123/75   Weight: 69.5 kg (153 lb 3.2 oz)        Problem List:  Dating by LMP = 9 wk US  Language Barrier (Barbadian)  Boy \"[place]\"  US 21w0d = 21w3d growth 56% had    PN Flowsheet Data:  Prenatal Fetal Information  Fetal HR: 137  Movement: Present   Comments: doing well       Current Outpatient Medications   Medication Instructions    Prenatal Vit-Fe Fumarate-FA (PRENATAL VITAMINS PO) Oral    triamcinolone (ARISTOCORT) 0.5 % ointment Apply topically 2 times daily.        Visit Diagnoses:   Diagnosis Orders   1. 33 weeks gestation of pregnancy            Follow Up:  Return in about 2 weeks (around 1/19/2024) for Routine OB.    Kirk Sandhu MD, FACOG  01/05/24  11:05 AM

## 2024-01-19 ENCOUNTER — ROUTINE PRENATAL (OUTPATIENT)
Age: 34
End: 2024-01-19

## 2024-01-19 VITALS — WEIGHT: 155 LBS | DIASTOLIC BLOOD PRESSURE: 76 MMHG | BODY MASS INDEX: 30.35 KG/M2 | SYSTOLIC BLOOD PRESSURE: 129 MMHG

## 2024-01-19 DIAGNOSIS — Z3A.35 35 WEEKS GESTATION OF PREGNANCY: Primary | ICD-10-CM

## 2024-01-19 DIAGNOSIS — R31.9 HEMATURIA, UNSPECIFIED TYPE: ICD-10-CM

## 2024-01-19 PROCEDURE — 0502F SUBSEQUENT PRENATAL CARE: CPT | Performed by: OBSTETRICS & GYNECOLOGY

## 2024-01-19 NOTE — PROGRESS NOTES
OB VISIT      Current EGA:   35w1d    Visit Notes:  Doing Well. +Fetal Movement.  No Ucs. No LOF or VB.   Some BHC.  Lots of pressure  GBS today  Total weight gain is 12.2 kg (27 lb).   (Total expected for pregnancy is 7 kg (15 lb)-11.5 kg (25 lb))  Last Weight Metrics:      1/19/2024    11:22 AM 1/5/2024    11:04 AM 12/22/2023    10:24 AM 12/1/2023    11:44 AM 11/10/2023    11:43 AM 10/12/2023    11:25 AM 10/5/2023     8:49 AM   Weight Loss Metrics   Height       4' 11.921\"   Weight - Scale 155 lbs 153 lbs 3 oz 150 lbs 146 lbs 10 oz 142 lbs 6 oz 139 lbs 3 oz 136 lbs   BMI (Calculated) 0 kg/m2 0 kg/m2 0 kg/m2 0 kg/m2 0 kg/m2 0 kg/m2 26.7 kg/m2        Vitals:    01/19/24 1122   BP: 129/76   Weight: 70.3 kg (155 lb)        Problem List:  Dating by LMP = 9 wk US  Language Barrier (Hungarian)  Boy \"Mark\"  US 21w0d = 21w3d growth 56% had    PN Flowsheet Data:  Fundal Height (cm): 35 cm  Prenatal Fetal Information  Fundal Height (cm): 35 cm  Fetal HR: 149  Movement: Present Albumin: Negative  Glucose: Negative Comments: doing well GBS  large bld urine Dilation (cm): Closed  Effacement: 30  Station: -3     Current Outpatient Medications   Medication Instructions    Prenatal Vit-Fe Fumarate-FA (PRENATAL VITAMINS PO) Oral    triamcinolone (ARISTOCORT) 0.5 % ointment Apply topically 2 times daily.        Visit Diagnoses:   Diagnosis Orders   1. 35 weeks gestation of pregnancy        2. Hematuria, unspecified type            Follow Up:  Return in about 1 week (around 1/26/2024) for Routine OB, OB Ultrasound.  Urine Culture today  Kirk Sandhu MD, FACOG  01/19/24  11:47 AM

## 2024-01-20 LAB — BACTERIA UR CULT: NORMAL

## 2024-01-21 LAB
GBS, EXTERNAL RESULT: NEGATIVE
GP B STREP DNA SPEC QL NAA+PROBE: NEGATIVE

## 2024-01-30 ENCOUNTER — ROUTINE PRENATAL (OUTPATIENT)
Age: 34
End: 2024-01-30

## 2024-01-30 VITALS — DIASTOLIC BLOOD PRESSURE: 78 MMHG | WEIGHT: 158.2 LBS | SYSTOLIC BLOOD PRESSURE: 137 MMHG | BODY MASS INDEX: 30.98 KG/M2

## 2024-01-30 DIAGNOSIS — Z3A.36 36 WEEKS GESTATION OF PREGNANCY: Primary | ICD-10-CM

## 2024-01-30 PROCEDURE — 0502F SUBSEQUENT PRENATAL CARE: CPT | Performed by: OBSTETRICS & GYNECOLOGY

## 2024-01-30 NOTE — PROGRESS NOTES
LIMITED OB SCAN A SINGLE VERTEX 36W5D IUP IS SEEN. FETAL CARDIAC MOTION OBSERVED. LIMITED ANATOMY WAS VISUALIZED AND APPEARS WNL. APPROPRIATE FETAL GROWTH IS SEEN. SIZE=DATES. JENNIFER AND PLACENTA APPEAR WITHIN NORMAL LIMITS.

## 2024-01-30 NOTE — PROGRESS NOTES
OB VISIT      Current EGA:   36w5d    Visit Notes:  Doing Well. +Fetal Movement.  No Ucs. No LOF or VB.   Had Us today showing growth 18%:  LIMITED OB SCAN A SINGLE VERTEX 36W5D IUP IS SEEN. FETAL CARDIAC MOTION OBSERVED. LIMITED ANATOMY WAS VISUALIZED AND APPEARS WNL. APPROPRIATE FETAL GROWTH IS SEEN. SIZE=DATES. JENNIFER AND PLACENTA APPEAR WITHIN NORMAL LIMITS.        Total weight gain is 13.7 kg (30 lb 3.2 oz).   (Total expected for pregnancy is 7 kg (15 lb)-11.5 kg (25 lb))  Last Weight Metrics:      1/30/2024     2:55 PM 1/19/2024    11:22 AM 1/5/2024    11:04 AM 12/22/2023    10:24 AM 12/1/2023    11:44 AM 11/10/2023    11:43 AM 10/12/2023    11:25 AM   Weight Loss Metrics   Weight - Scale 158 lbs 3 oz 155 lbs 153 lbs 3 oz 150 lbs 146 lbs 10 oz 142 lbs 6 oz 139 lbs 3 oz   BMI (Calculated) 0 kg/m2 0 kg/m2 0 kg/m2 0 kg/m2 0 kg/m2 0 kg/m2 0 kg/m2        Vitals:    01/30/24 1455   BP: 137/78   Weight: 71.8 kg (158 lb 3.2 oz)        Problem List:  Dating by LMP = 9 wk US  Language Barrier (Amharic)  Boy \"[place]\"  US 21w0d = 21w3d growth 56% had  US 36w5d = 35w4d growth 18 %    PN Flowsheet Data:  Fundal Height (cm): 36 cm  Prenatal Fetal Information  Fundal Height (cm): 36 cm  Fetal HR: U/S  Movement: Present Albumin: Negative  Glucose: Negative Comments: doing well  large bld urine Dilation (cm): 2  Effacement: 70  Station: -3     Current Outpatient Medications   Medication Instructions    Prenatal Vit-Fe Fumarate-FA (PRENATAL VITAMINS PO) Oral    triamcinolone (ARISTOCORT) 0.5 % ointment Apply topically 2 times daily.        Visit Diagnoses:   Diagnosis Orders   1. 36 weeks gestation of pregnancy            Follow Up:  Return in about 1 week (around 2/6/2024) for Routine OB.    Kirk Sandhu MD, FACOG  01/30/24  3:19 PM

## 2024-02-06 ENCOUNTER — ROUTINE PRENATAL (OUTPATIENT)
Age: 34
End: 2024-02-06

## 2024-02-06 VITALS — DIASTOLIC BLOOD PRESSURE: 76 MMHG | SYSTOLIC BLOOD PRESSURE: 137 MMHG | BODY MASS INDEX: 30.94 KG/M2 | WEIGHT: 158 LBS

## 2024-02-06 DIAGNOSIS — Z3A.37 37 WEEKS GESTATION OF PREGNANCY: Primary | ICD-10-CM

## 2024-02-06 PROCEDURE — 0502F SUBSEQUENT PRENATAL CARE: CPT | Performed by: OBSTETRICS & GYNECOLOGY

## 2024-02-06 NOTE — PROGRESS NOTES
OB VISIT      Current EGA:   37w5d    Visit Notes:  Doing Well. +Fetal Movement.  No Ucs. No LOF or VB.   Some BHC and LBP.    Total weight gain is 13.6 kg (30 lb).   (Total expected for pregnancy is 7 kg (15 lb)-11.5 kg (25 lb))  Last Weight Metrics:      2/6/2024     2:16 PM 1/30/2024     2:55 PM 1/19/2024    11:22 AM 1/5/2024    11:04 AM 12/22/2023    10:24 AM 12/1/2023    11:44 AM 11/10/2023    11:43 AM   Weight Loss Metrics   Weight - Scale 158 lbs 158 lbs 3 oz 155 lbs 153 lbs 3 oz 150 lbs 146 lbs 10 oz 142 lbs 6 oz   BMI (Calculated) 0 kg/m2 0 kg/m2 0 kg/m2 0 kg/m2 0 kg/m2 0 kg/m2 0 kg/m2        Vitals:    02/06/24 1416   BP: 137/76   Weight: 71.7 kg (158 lb)        Problem List:  Dating by LMP = 9 wk US  Language Barrier (Ukrainian)  Boy \"[place]\"  US 21w0d = 21w3d growth 56% had  US 36w5d = 35w4d growth 18 % had    PN Flowsheet Data:  Fundal Height (cm): 36 cm  Prenatal Fetal Information  Fundal Height (cm): 36 cm  Fetal HR: 135  Movement: Present Albumin: Negative  Glucose: Negative Comments: doing well Dilation (cm): 2  Effacement: 70  Station: -3     Current Outpatient Medications   Medication Instructions    Prenatal Vit-Fe Fumarate-FA (PRENATAL VITAMINS PO) Oral    triamcinolone (ARISTOCORT) 0.5 % ointment Apply topically 2 times daily.        Visit Diagnoses:   Diagnosis Orders   1. 37 weeks gestation of pregnancy            Follow Up:  No follow-ups on file.    Kirk Sandhu MD, FACOG  02/06/24  2:44 PM

## 2024-02-13 ENCOUNTER — ROUTINE PRENATAL (OUTPATIENT)
Age: 34
End: 2024-02-13

## 2024-02-13 VITALS — BODY MASS INDEX: 30.98 KG/M2 | DIASTOLIC BLOOD PRESSURE: 73 MMHG | WEIGHT: 158.2 LBS | SYSTOLIC BLOOD PRESSURE: 136 MMHG

## 2024-02-13 DIAGNOSIS — Z3A.38 38 WEEKS GESTATION OF PREGNANCY: Primary | ICD-10-CM

## 2024-02-13 PROCEDURE — 0502F SUBSEQUENT PRENATAL CARE: CPT | Performed by: OBSTETRICS & GYNECOLOGY

## 2024-02-13 NOTE — PROGRESS NOTES
OB VISIT      Current EGA:   38w5d    Visit Notes:  Doing Well. +Fetal Movement.  No Ucs. No LOF or VB.   More pressure & vaginal discomfort    Total weight gain is 13.7 kg (30 lb 3.2 oz).   (Total expected for pregnancy is 7 kg (15 lb)-11.5 kg (25 lb))  Last Weight Metrics:      2/13/2024     3:32 PM 2/6/2024     2:16 PM 1/30/2024     2:55 PM 1/19/2024    11:22 AM 1/5/2024    11:04 AM 12/22/2023    10:24 AM 12/1/2023    11:44 AM   Weight Loss Metrics   Weight - Scale 158 lbs 3 oz 158 lbs 158 lbs 3 oz 155 lbs 153 lbs 3 oz 150 lbs 146 lbs 10 oz   BMI (Calculated) 0 kg/m2 0 kg/m2 0 kg/m2 0 kg/m2 0 kg/m2 0 kg/m2 0 kg/m2        Vitals:    02/13/24 1532   BP: 136/73   Weight: 71.8 kg (158 lb 3.2 oz)        Problem List:  Dating by LMP = 9 wk US  Language Barrier (Vietnamese)  Boy \"Mark\"  US 21w0d = 21w3d growth 56% had  US 36w5d = 35w4d growth 18 % had    PN Flowsheet Data:  Fundal Height (cm): 38 cm  Prenatal Fetal Information  Fundal Height (cm): 38 cm  Fetal HR: 152  Movement: Present Albumin: Negative  Glucose: Negative Comments: doing well urine large blood Dilation (cm): 4  Effacement: 80  Station: -3   Membrane sweep today    Current Outpatient Medications   Medication Instructions    Prenatal Vit-Fe Fumarate-FA (PRENATAL VITAMINS PO) Oral    triamcinolone (ARISTOCORT) 0.5 % ointment Apply topically 2 times daily.        Visit Diagnoses:   Diagnosis Orders   1. 38 weeks gestation of pregnancy            Follow Up:  Return in about 1 week (around 2/20/2024) for Routine OB.    Kirk Sandhu MD, FACOG  02/13/24  4:06 PM  d

## 2024-02-20 ENCOUNTER — ROUTINE PRENATAL (OUTPATIENT)
Age: 34
End: 2024-02-20
Payer: MEDICAID

## 2024-02-20 VITALS — DIASTOLIC BLOOD PRESSURE: 76 MMHG | BODY MASS INDEX: 31.8 KG/M2 | WEIGHT: 162.4 LBS | SYSTOLIC BLOOD PRESSURE: 146 MMHG

## 2024-02-20 DIAGNOSIS — Z3A.39 39 WEEKS GESTATION OF PREGNANCY: Primary | ICD-10-CM

## 2024-02-20 PROCEDURE — 59426 ANTEPARTUM CARE ONLY: CPT | Performed by: OBSTETRICS & GYNECOLOGY

## 2024-02-20 NOTE — PROGRESS NOTES
OB VISIT      Current EGA:   39w5d    Visit Notes:  Doing Well. +Fetal Movement.  Some Ucs. No LOF or VB.   No sx Pre E    Total weight gain is 15.6 kg (34 lb 6.4 oz).   (Total expected for pregnancy is 7 kg (15 lb)-11.5 kg (25 lb))  Last Weight Metrics:      2/20/2024     3:06 PM 2/13/2024     3:32 PM 2/6/2024     2:16 PM 1/30/2024     2:55 PM 1/19/2024    11:22 AM 1/5/2024    11:04 AM 12/22/2023    10:24 AM   Weight Loss Metrics   Weight - Scale 162 lbs 6 oz 158 lbs 3 oz 158 lbs 158 lbs 3 oz 155 lbs 153 lbs 3 oz 150 lbs   BMI (Calculated) 0 kg/m2 0 kg/m2 0 kg/m2 0 kg/m2 0 kg/m2 0 kg/m2 0 kg/m2        Vitals:    02/20/24 1506   BP: (!) 146/76   Weight: 73.7 kg (162 lb 6.4 oz)        Problem List:  Dating by LMP = 9 wk US  Language Barrier (Syriac)  Boy \"Mark\"  US 21w0d = 21w3d growth 56% had  US 36w5d = 35w4d growth 18 % had    PN Flowsheet Data:  Fundal Height (cm): 39 cm  Prenatal Fetal Information  Fundal Height (cm): 39 cm  Fetal HR: 165  Movement: Present Albumin: Negative  Glucose: Negative Comments: doing well urine large blood Dilation (cm): 4  Effacement: 80  Station: -3     MEMBRANE SWEEP DONE      Current Outpatient Medications   Medication Instructions    Prenatal Vit-Fe Fumarate-FA (PRENATAL VITAMINS PO) Oral    triamcinolone (ARISTOCORT) 0.5 % ointment Apply topically 2 times daily.        Visit Diagnoses:   Diagnosis Orders   1. 39 weeks gestation of pregnancy            Follow Up:  Return in about 3 days (around 2/23/2024) for Induction of Labor.  Posted for IOL 2/23 5am  Kirk Sandhu MD, FACOG  02/20/24  3:24 PM

## 2024-02-22 ENCOUNTER — HOSPITAL ENCOUNTER (INPATIENT)
Facility: HOSPITAL | Age: 34
LOS: 2 days | Discharge: HOME | DRG: 560 | End: 2024-02-25
Attending: OBSTETRICS & GYNECOLOGY | Admitting: OBSTETRICS & GYNECOLOGY
Payer: MEDICAID

## 2024-02-22 PROCEDURE — 7210000100 HC LABOR FEE PER 1 HR: Performed by: OBSTETRICS & GYNECOLOGY

## 2024-02-22 RX ORDER — ACETAMINOPHEN 500 MG
500 TABLET ORAL EVERY 6 HOURS PRN
COMMUNITY

## 2024-02-23 ENCOUNTER — ANESTHESIA EVENT (OUTPATIENT)
Facility: HOSPITAL | Age: 34
End: 2024-02-23
Payer: MEDICAID

## 2024-02-23 ENCOUNTER — ANESTHESIA (OUTPATIENT)
Facility: HOSPITAL | Age: 34
End: 2024-02-23
Payer: MEDICAID

## 2024-02-23 PROBLEM — Z37.9 NORMAL LABOR: Status: ACTIVE | Noted: 2024-02-23

## 2024-02-23 LAB
ABO + RH BLD: NORMAL
BASOPHILS # BLD: 0 K/UL (ref 0–0.1)
BASOPHILS NFR BLD: 0 % (ref 0–1)
BLOOD GROUP ANTIBODIES SERPL: NORMAL
DIFFERENTIAL METHOD BLD: ABNORMAL
EOSINOPHIL # BLD: 0.1 K/UL (ref 0–0.4)
EOSINOPHIL NFR BLD: 1 % (ref 0–7)
ERYTHROCYTE [DISTWIDTH] IN BLOOD BY AUTOMATED COUNT: 14.6 % (ref 11.5–14.5)
HCT VFR BLD AUTO: 37.8 % (ref 35–47)
HGB BLD-MCNC: 12.8 G/DL (ref 11.5–16)
HISTORY CHECK: NORMAL
IMM GRANULOCYTES # BLD AUTO: 0.1 K/UL (ref 0–0.04)
IMM GRANULOCYTES NFR BLD AUTO: 1 % (ref 0–0.5)
LYMPHOCYTES # BLD: 2.5 K/UL (ref 0.8–3.5)
LYMPHOCYTES NFR BLD: 25 % (ref 12–49)
MCH RBC QN AUTO: 26 PG (ref 26–34)
MCHC RBC AUTO-ENTMCNC: 33.9 G/DL (ref 30–36.5)
MCV RBC AUTO: 76.8 FL (ref 80–99)
MONOCYTES # BLD: 0.9 K/UL (ref 0–1)
MONOCYTES NFR BLD: 8 % (ref 5–13)
NEUTS SEG # BLD: 6.6 K/UL (ref 1.8–8)
NEUTS SEG NFR BLD: 65 % (ref 32–75)
NRBC # BLD: 0 K/UL (ref 0–0.01)
NRBC BLD-RTO: 0 PER 100 WBC
PLATELET # BLD AUTO: 215 K/UL (ref 150–400)
PMV BLD AUTO: 12.5 FL (ref 8.9–12.9)
RBC # BLD AUTO: 4.92 M/UL (ref 3.8–5.2)
SPECIMEN EXP DATE BLD: NORMAL
WBC # BLD AUTO: 10.1 K/UL (ref 3.6–11)

## 2024-02-23 PROCEDURE — 36415 COLL VENOUS BLD VENIPUNCTURE: CPT

## 2024-02-23 PROCEDURE — 6360000002 HC RX W HCPCS: Performed by: NURSE ANESTHETIST, CERTIFIED REGISTERED

## 2024-02-23 PROCEDURE — 0KQM0ZZ REPAIR PERINEUM MUSCLE, OPEN APPROACH: ICD-10-PCS | Performed by: OBSTETRICS & GYNECOLOGY

## 2024-02-23 PROCEDURE — 2580000003 HC RX 258: Performed by: ADVANCED PRACTICE MIDWIFE

## 2024-02-23 PROCEDURE — 7210000100 HC LABOR FEE PER 1 HR: Performed by: OBSTETRICS & GYNECOLOGY

## 2024-02-23 PROCEDURE — 59410 OBSTETRICAL CARE: CPT | Performed by: OBSTETRICS & GYNECOLOGY

## 2024-02-23 PROCEDURE — 86900 BLOOD TYPING SEROLOGIC ABO: CPT

## 2024-02-23 PROCEDURE — 2500000003 HC RX 250 WO HCPCS: Performed by: NURSE ANESTHETIST, CERTIFIED REGISTERED

## 2024-02-23 PROCEDURE — 76815 OB US LIMITED FETUS(S): CPT | Performed by: OBSTETRICS & GYNECOLOGY

## 2024-02-23 PROCEDURE — 1120000000 HC RM PRIVATE OB

## 2024-02-23 PROCEDURE — 7220000101 HC DELIVERY VAGINAL/SINGLE: Performed by: OBSTETRICS & GYNECOLOGY

## 2024-02-23 PROCEDURE — 3609079900 HC CESAREAN SECTION: Performed by: OBSTETRICS & GYNECOLOGY

## 2024-02-23 PROCEDURE — 2709999900 HC NON-CHARGEABLE SUPPLY: Performed by: OBSTETRICS & GYNECOLOGY

## 2024-02-23 PROCEDURE — 85025 COMPLETE CBC W/AUTO DIFF WBC: CPT

## 2024-02-23 PROCEDURE — 86850 RBC ANTIBODY SCREEN: CPT

## 2024-02-23 PROCEDURE — 00HU33Z INSERTION OF INFUSION DEVICE INTO SPINAL CANAL, PERCUTANEOUS APPROACH: ICD-10-PCS | Performed by: ANESTHESIOLOGY

## 2024-02-23 PROCEDURE — 86901 BLOOD TYPING SEROLOGIC RH(D): CPT

## 2024-02-23 PROCEDURE — 6370000000 HC RX 637 (ALT 250 FOR IP): Performed by: OBSTETRICS & GYNECOLOGY

## 2024-02-23 PROCEDURE — 3700000156 HC EPIDURAL ANESTHESIA: Performed by: ANESTHESIOLOGY

## 2024-02-23 PROCEDURE — 6360000002 HC RX W HCPCS: Performed by: ANESTHESIOLOGY

## 2024-02-23 PROCEDURE — 6360000002 HC RX W HCPCS: Performed by: ADVANCED PRACTICE MIDWIFE

## 2024-02-23 RX ORDER — EPHEDRINE SULFATE/0.9% NACL/PF 50 MG/5 ML
SYRINGE (ML) INTRAVENOUS PRN
Status: DISCONTINUED | OUTPATIENT
Start: 2024-02-23 | End: 2024-02-23 | Stop reason: SDUPTHER

## 2024-02-23 RX ORDER — OXYCODONE HYDROCHLORIDE 5 MG/1
5 TABLET ORAL EVERY 4 HOURS PRN
Status: DISCONTINUED | OUTPATIENT
Start: 2024-02-23 | End: 2024-02-25 | Stop reason: HOSPADM

## 2024-02-23 RX ORDER — TRANEXAMIC ACID 10 MG/ML
1000 INJECTION, SOLUTION INTRAVENOUS
Status: ACTIVE | OUTPATIENT
Start: 2024-02-23 | End: 2024-02-24

## 2024-02-23 RX ORDER — METHYLERGONOVINE MALEATE 0.2 MG/ML
200 INJECTION INTRAVENOUS PRN
Status: DISCONTINUED | OUTPATIENT
Start: 2024-02-23 | End: 2024-02-25 | Stop reason: HOSPADM

## 2024-02-23 RX ORDER — SODIUM CHLORIDE 9 MG/ML
25 INJECTION, SOLUTION INTRAVENOUS PRN
Status: DISCONTINUED | OUTPATIENT
Start: 2024-02-23 | End: 2024-02-23

## 2024-02-23 RX ORDER — ONDANSETRON 2 MG/ML
4 INJECTION INTRAMUSCULAR; INTRAVENOUS EVERY 6 HOURS PRN
Status: DISCONTINUED | OUTPATIENT
Start: 2024-02-23 | End: 2024-02-25 | Stop reason: HOSPADM

## 2024-02-23 RX ORDER — SODIUM CHLORIDE 0.9 % (FLUSH) 0.9 %
5-40 SYRINGE (ML) INJECTION EVERY 12 HOURS SCHEDULED
Status: DISCONTINUED | OUTPATIENT
Start: 2024-02-23 | End: 2024-02-25 | Stop reason: HOSPADM

## 2024-02-23 RX ORDER — SODIUM CHLORIDE, SODIUM LACTATE, POTASSIUM CHLORIDE, CALCIUM CHLORIDE 600; 310; 30; 20 MG/100ML; MG/100ML; MG/100ML; MG/100ML
INJECTION, SOLUTION INTRAVENOUS CONTINUOUS
Status: DISCONTINUED | OUTPATIENT
Start: 2024-02-23 | End: 2024-02-23

## 2024-02-23 RX ORDER — SODIUM CHLORIDE 0.9 % (FLUSH) 0.9 %
5-40 SYRINGE (ML) INJECTION EVERY 12 HOURS SCHEDULED
Status: DISCONTINUED | OUTPATIENT
Start: 2024-02-23 | End: 2024-02-23

## 2024-02-23 RX ORDER — OXYTOCIN 10 [USP'U]/ML
INJECTION, SOLUTION INTRAMUSCULAR; INTRAVENOUS PRN
Status: DISCONTINUED | OUTPATIENT
Start: 2024-02-23 | End: 2024-02-23 | Stop reason: SDUPTHER

## 2024-02-23 RX ORDER — IBUPROFEN 800 MG/1
800 TABLET ORAL EVERY 8 HOURS SCHEDULED
Status: DISCONTINUED | OUTPATIENT
Start: 2024-02-23 | End: 2024-02-25 | Stop reason: HOSPADM

## 2024-02-23 RX ORDER — SODIUM CHLORIDE, SODIUM LACTATE, POTASSIUM CHLORIDE, CALCIUM CHLORIDE 600; 310; 30; 20 MG/100ML; MG/100ML; MG/100ML; MG/100ML
INJECTION, SOLUTION INTRAVENOUS CONTINUOUS
Status: DISCONTINUED | OUTPATIENT
Start: 2024-02-23 | End: 2024-02-25 | Stop reason: HOSPADM

## 2024-02-23 RX ORDER — MISOPROSTOL 200 UG/1
800 TABLET ORAL PRN
Status: DISCONTINUED | OUTPATIENT
Start: 2024-02-23 | End: 2024-02-23

## 2024-02-23 RX ORDER — SODIUM CHLORIDE 9 MG/ML
INJECTION, SOLUTION INTRAVENOUS PRN
Status: DISCONTINUED | OUTPATIENT
Start: 2024-02-23 | End: 2024-02-25 | Stop reason: HOSPADM

## 2024-02-23 RX ORDER — DOCUSATE SODIUM 100 MG/1
100 CAPSULE, LIQUID FILLED ORAL 2 TIMES DAILY
Status: DISCONTINUED | OUTPATIENT
Start: 2024-02-23 | End: 2024-02-23

## 2024-02-23 RX ORDER — TRANEXAMIC ACID 10 MG/ML
1000 INJECTION, SOLUTION INTRAVENOUS
Status: DISCONTINUED | OUTPATIENT
Start: 2024-02-23 | End: 2024-02-23

## 2024-02-23 RX ORDER — ACETAMINOPHEN 500 MG
1000 TABLET ORAL EVERY 8 HOURS SCHEDULED
Status: DISCONTINUED | OUTPATIENT
Start: 2024-02-23 | End: 2024-02-25 | Stop reason: HOSPADM

## 2024-02-23 RX ORDER — DOCUSATE SODIUM 100 MG/1
100 CAPSULE, LIQUID FILLED ORAL 2 TIMES DAILY
Status: DISCONTINUED | OUTPATIENT
Start: 2024-02-23 | End: 2024-02-25 | Stop reason: HOSPADM

## 2024-02-23 RX ORDER — LANOLIN/MINERAL OIL
LOTION (ML) TOPICAL PRN
Status: DISCONTINUED | OUTPATIENT
Start: 2024-02-23 | End: 2024-02-25 | Stop reason: HOSPADM

## 2024-02-23 RX ORDER — ACETAMINOPHEN 325 MG/1
650 TABLET ORAL EVERY 4 HOURS PRN
Status: DISCONTINUED | OUTPATIENT
Start: 2024-02-23 | End: 2024-02-23

## 2024-02-23 RX ORDER — METHYLERGONOVINE MALEATE 0.2 MG/ML
200 INJECTION INTRAVENOUS PRN
Status: DISCONTINUED | OUTPATIENT
Start: 2024-02-23 | End: 2024-02-23

## 2024-02-23 RX ORDER — CARBOPROST TROMETHAMINE 250 UG/ML
250 INJECTION, SOLUTION INTRAMUSCULAR
Status: ACTIVE | OUTPATIENT
Start: 2024-02-23 | End: 2024-02-24

## 2024-02-23 RX ORDER — OXYCODONE HYDROCHLORIDE 5 MG/1
10 TABLET ORAL EVERY 4 HOURS PRN
Status: DISCONTINUED | OUTPATIENT
Start: 2024-02-23 | End: 2024-02-25 | Stop reason: HOSPADM

## 2024-02-23 RX ORDER — MORPHINE SULFATE 1 MG/ML
INJECTION, SOLUTION EPIDURAL; INTRATHECAL; INTRAVENOUS PRN
Status: DISCONTINUED | OUTPATIENT
Start: 2024-02-23 | End: 2024-02-23 | Stop reason: SDUPTHER

## 2024-02-23 RX ORDER — MAGNESIUM CARB/ALUMINUM HYDROX 105-160MG
30 TABLET,CHEWABLE ORAL DAILY PRN
Status: DISCONTINUED | OUTPATIENT
Start: 2024-02-23 | End: 2024-02-23

## 2024-02-23 RX ORDER — ONDANSETRON 2 MG/ML
4 INJECTION INTRAMUSCULAR; INTRAVENOUS EVERY 6 HOURS PRN
Status: DISCONTINUED | OUTPATIENT
Start: 2024-02-23 | End: 2024-02-23

## 2024-02-23 RX ORDER — SODIUM CHLORIDE 9 MG/ML
INJECTION, SOLUTION INTRAVENOUS PRN
Status: DISCONTINUED | OUTPATIENT
Start: 2024-02-23 | End: 2024-02-23

## 2024-02-23 RX ORDER — CARBOPROST TROMETHAMINE 250 UG/ML
250 INJECTION, SOLUTION INTRAMUSCULAR PRN
Status: DISCONTINUED | OUTPATIENT
Start: 2024-02-23 | End: 2024-02-23

## 2024-02-23 RX ORDER — BUPIVACAINE HYDROCHLORIDE 7.5 MG/ML
INJECTION, SOLUTION INTRASPINAL PRN
Status: DISCONTINUED | OUTPATIENT
Start: 2024-02-23 | End: 2024-02-23 | Stop reason: SDUPTHER

## 2024-02-23 RX ORDER — SODIUM CHLORIDE, SODIUM LACTATE, POTASSIUM CHLORIDE, AND CALCIUM CHLORIDE .6; .31; .03; .02 G/100ML; G/100ML; G/100ML; G/100ML
500 INJECTION, SOLUTION INTRAVENOUS PRN
Status: DISCONTINUED | OUTPATIENT
Start: 2024-02-23 | End: 2024-02-23

## 2024-02-23 RX ORDER — SODIUM CHLORIDE 0.9 % (FLUSH) 0.9 %
5-40 SYRINGE (ML) INJECTION PRN
Status: DISCONTINUED | OUTPATIENT
Start: 2024-02-23 | End: 2024-02-23

## 2024-02-23 RX ORDER — SODIUM CHLORIDE 0.9 % (FLUSH) 0.9 %
5-40 SYRINGE (ML) INJECTION PRN
Status: DISCONTINUED | OUTPATIENT
Start: 2024-02-23 | End: 2024-02-25 | Stop reason: HOSPADM

## 2024-02-23 RX ORDER — SODIUM CHLORIDE, SODIUM LACTATE, POTASSIUM CHLORIDE, AND CALCIUM CHLORIDE .6; .31; .03; .02 G/100ML; G/100ML; G/100ML; G/100ML
1000 INJECTION, SOLUTION INTRAVENOUS PRN
Status: DISCONTINUED | OUTPATIENT
Start: 2024-02-23 | End: 2024-02-23

## 2024-02-23 RX ADMIN — BUPIVACAINE HYDROCHLORIDE IN DEXTROSE 1.1 ML: 7.5 INJECTION, SOLUTION SUBARACHNOID at 05:34

## 2024-02-23 RX ADMIN — OXYTOCIN 999 MILLI-UNITS/MIN: 10 INJECTION INTRAVENOUS at 03:33

## 2024-02-23 RX ADMIN — IBUPROFEN 800 MG: 800 TABLET, FILM COATED ORAL at 19:55

## 2024-02-23 RX ADMIN — SODIUM CHLORIDE, POTASSIUM CHLORIDE, SODIUM LACTATE AND CALCIUM CHLORIDE 1000 ML: 600; 310; 30; 20 INJECTION, SOLUTION INTRAVENOUS at 05:12

## 2024-02-23 RX ADMIN — SODIUM CHLORIDE, POTASSIUM CHLORIDE, SODIUM LACTATE AND CALCIUM CHLORIDE 1000 ML: 600; 310; 30; 20 INJECTION, SOLUTION INTRAVENOUS at 01:35

## 2024-02-23 RX ADMIN — IBUPROFEN 800 MG: 800 TABLET, FILM COATED ORAL at 11:10

## 2024-02-23 RX ADMIN — Medication 10 MG: at 05:36

## 2024-02-23 RX ADMIN — OXYTOCIN 30 UNITS: 10 INJECTION, SOLUTION INTRAMUSCULAR; INTRAVENOUS at 05:51

## 2024-02-23 RX ADMIN — MORPHINE SULFATE 0.15 MG: 1 INJECTION, SOLUTION EPIDURAL; INTRATHECAL; INTRAVENOUS at 05:34

## 2024-02-23 RX ADMIN — Medication 10 MG: at 05:52

## 2024-02-23 RX ADMIN — DOCUSATE SODIUM 100 MG: 100 CAPSULE, LIQUID FILLED ORAL at 19:57

## 2024-02-23 ASSESSMENT — PAIN DESCRIPTION - DESCRIPTORS
DESCRIPTORS: SORE
DESCRIPTORS: SORE

## 2024-02-23 ASSESSMENT — PAIN DESCRIPTION - LOCATION: LOCATION: BACK

## 2024-02-23 ASSESSMENT — PAIN SCALES - GENERAL
PAINLEVEL_OUTOF10: 5
PAINLEVEL_OUTOF10: 3

## 2024-02-23 ASSESSMENT — PAIN DESCRIPTION - ORIENTATION
ORIENTATION: LEFT
ORIENTATION: LOWER;POSTERIOR

## 2024-02-23 NOTE — PROGRESS NOTES
0238 Patient actively pushing.  RN remains in continuous attendance at the bedside.  Assessment & evaluation of fetal heart rate ongoing via continuous EFM.     0328 LISA Antoine at the bedside.    0435 Dr Talamantes at the bedside to evaluate patient.    0440 Bedside ultrasound performed by Dr Talamantes.    0516 To the or via bed.    0539 NICU team in the or, please see delivery summery.    0543 Forceps on    0545 Rotation of baby    0547:21-48  Pull of the forceps by Dr Talamantes    0547 Forceps off    0549 RN remained at bedside throughout pushing.  EFM continuously assessed.  Vaginal delivery of viable infant.     0627 Back to room 212 via bed.

## 2024-02-23 NOTE — ANESTHESIA PRE PROCEDURE
Department of Anesthesiology  Preprocedure Note       Name:  Louise Galicia   Age:  33 y.o.  :  1990                                          MRN:  [id number]         Date:  2024      Surgeon: Surgeon(s):  Vitaly Talamantes MD    Procedure: Procedure(s):   SECTION    Medications prior to admission:   Prior to Admission medications    Medication Sig Start Date End Date Taking? Authorizing Provider   acetaminophen (TYLENOL) 500 MG tablet Take 1 tablet by mouth every 6 hours as needed for Pain   Yes Provider, MD Karsten   triamcinolone (ARISTOCORT) 0.5 % ointment Apply topically 2 times daily.  Patient not taking: Reported on 2024 10/12/23   Kirk Sandhu II, MD   Prenatal Vit-Fe Fumarate-FA (PRENATAL VITAMINS PO) Take by mouth    Provider, MD Karsten       Current medications:    Current Facility-Administered Medications   Medication Dose Route Frequency Provider Last Rate Last Admin   • lactated ringers IV soln infusion   IntraVENous Continuous Billy Antoine APRN - LISA       • lactated ringers bolus 500 mL  500 mL IntraVENous PRN Billy Antoine APRN - LISA        Or   • lactated ringers bolus 1,000 mL  1,000 mL IntraVENous PRN Billy Antoine APRN - CNJIMMY 495.9 mL/hr at 24 0512 1,000 mL at 24 0512   • sodium chloride flush 0.9 % injection 5-40 mL  5-40 mL IntraVENous 2 times per day Billy Antoine APRN - CNJIMMY       • sodium chloride flush 0.9 % injection 5-40 mL  5-40 mL IntraVENous PRN Billy Antoine APRN - CNJIMMY       • 0.9 % sodium chloride infusion  25 mL IntraVENous PRN Billy Antoine APRN - CNJIMMY       • ondansetron (ZOFRAN) injection 4 mg  4 mg IntraVENous Q6H PRN Billy Antoine APRN - LISA       • oxytocin (PITOCIN) 30 units in 500 mL infusion  87.3 everette-units/min IntraVENous Continuous PRN Billy Antoine APRN - CNJIMMY 999 mL/hr at 24 0333 999 everette-units/min at 24 0333    And   •

## 2024-02-23 NOTE — LACTATION NOTE
This note was copied from a baby's chart.  Experienced breastfeeding mother.    Discussed with mother her plan for feeding.  Reviewed the benefits of exclusive breast milk feeding during the hospital stay.   Informed her of the risks of using formula to supplement in the first few days of life as well as the benefits of successful breast milk feeding; referred her to the Breastfeeding booklet about this information.   She acknowledges understanding of information reviewed and states that it is her plan to breastfeed/formula feed her infant.  Will support her choice and offer additional information as needed.       Encouraged mom to attempt feeding with baby led feeding cues. Just as sucking on fingers, rooting, mouthing.   Looking for 8-12 feedings in 24 hours.   Don't limit baby at breast, allow baby to come of breast on it's own. Baby may want to feed  often and may increase number of feedings on second day of life. Skin to skin encouraged.      If baby doesn't nurse,  Mom should  hand express  10-20 drops of colostrum and drip into baby's mouth, or give to baby by finger feeding, cup feeding, or spoon feeding at least every 2-3 hours.     Mother will successfully establish breastfeeding by feeding in response to early feeding cues   or wake every 3h, will obtain deep latch, and will keep log of feedings/output.  Taught to BF at hunger cues and or q 2-3 hrs and to offer 10-20 drops of hand expressed colostrum at any non-feeds.      Left Breast: Soft  Left Nipple: Protrude  Right Nipple: Protrude  Right Breast: Soft  Position and Latch: With assistance                         Lactation Comment: Mother just finished breastfeeding her baby for 10 minutes with staff RN assistance.

## 2024-02-23 NOTE — L&D DELIVERY NOTE
Forceps Delivery Procedure Note    Delivery Physician:  Vitaly Talamantes MD    Indication: Prolonged second stage    Relevant history:  33 y.o.  pushing for over 2 hours without progress.    Fetal rate category: Category 2    Contraindication:None    EGA: 40w1d    EFW: 8 pounds    Pelvis: Adequate for vaginal delivery    Cervix: Fully dilated and retracted    Membranes: Ruptured    Amniotic Fluid: Clear    Is the fetal head engaged? Yes    Station: +2    Angle of Progression: 159 Degrees = Station 4.0 cm    Fetal head position: Direct occiput Posterior    Asynclitism: None    Caput: +3    Abdominal Exam Fetal head palpated: None (0/5ths)    Molding:+1 parietal bones meet     Fetal spine location: Maternal left    Fetal position confirmed with ultrasound: Yes    Location for procedure: Operating room    Anesthesia: Spinal    Maternal bladder emptied: Yes    Forceps type:Luikart-Kielland    Forceps classification: Low    Number of pulls: 3    Rotation of head: > 90 degrees    Traction: Moderate    Maternal effort: Moderate    Total time forceps applied to fetal head: 4 minutes    Successful: Yes    Shoulder dystocia: No    Birth: Baby Weight:   Information for the patient's :  Edgardo Baxter [[id number]]   Birth Weight: 3.35 kg (7 lb 6.2 oz)     Baby Apgar 1 min:   Information for the patient's :  Edgardo Baxter [[id number]]   APGAR One: 9     Baby Apgar 5 min:   Information for the patient's :  Edgardo Baxter [[id number]]   APGAR Five: 9     Baby :   Information for the patient's :  Edgardo Baxter [[id number]]   2024     Baby Type of Delivery:   Information for the patient's :  Edgardo Baxter [[id number]]   Delivery Method: Vaginal, Forceps     Baby Gender:   Information for the patient's :  Edgardo Baxter [[id number]]   male       Umbilical Cord: 3 vessels

## 2024-02-23 NOTE — ANESTHESIA PROCEDURE NOTES
Spinal Block    Patient location during procedure: OR  End time: 2/23/2024 5:37 AM  Reason for block: post-op pain management, primary anesthetic and at surgeon's request  Staffing  Anesthesiologist: Martin Stephenson MD  Performed by: Martin Stephenson MD  Authorized by: Martin Stephenson MD    Spinal Block  Patient position: sitting  Prep: DuraPrep  Patient monitoring: cardiac monitor, continuous pulse ox, continuous capnometry, frequent blood pressure checks and oxygen  Approach: midline  Location: L4/L5  Provider prep: mask and sterile gloves  Needle  Needle type: Sprotte Tip   Needle gauge: 24 G  Needle length: 4 in  Assessment  Sensory level: T4  Swirl obtained: Yes  CSF: clear  Attempts: 3+  Hemodynamics: stable  Additional Notes  Pt unable to tolerate position while sitting, turned Lateral - easy x1  1.1cc 0.75% heavy bupiv + 150ucg PF Morphine  for POPM-PSR  Preanesthetic Checklist  Completed: patient identified, IV checked, site marked, risks and benefits discussed, surgical/procedural consents, pre-op evaluation, timeout performed, anesthesia consent given, oxygen available and monitors applied/VS acknowledged

## 2024-02-23 NOTE — PROGRESS NOTES
Labor Progress Note  Patient seen, fetal heart rate and contraction pattern evaluated, patient examined.  Patient is pushing well, an she has no anesthesia.    Relevant history:  33 y.o.  pushing for almost three hours with arrest of descent.  EGA: 40w1d    Physical Exam:  Vitals:    24 0200   BP:    Pulse:    Resp:    Temp:    SpO2: 100%        33 y.o.  female in no acute distress.    Uterine Activity:   Frequency: Every 2 - 3 minutes   Duration: 60 seconds   Intensity: moderate  Fetal Heart Rate:    Baseline: 130 bpm   Variability: Moderate   Accelerations: Absent   Decelerations: Variable  Category: 2    EFW: 8 pounds    Pelvis: Adequate for vaginal delivery    Cervix: Fully dilated and retracted    Membranes: Ruptured    Amniotic Fluid: Clear    Is the fetal head engaged? Yes    Station: +2    Angle of Progression: 159 Degrees = Station 4.0 cm    Fetal head position: Direct occiput Posterior    Asynclitism: None    Caput: +3    Molding: +1 parietal bones meet    Fetal spine location: Maternal left    Fetal position confirmed with ultrasound: Yes    Recent Results (from the past 12 hour(s))   CBC with Auto Differential    Collection Time: 24  1:31 AM   Result Value Ref Range    WBC 10.1 3.6 - 11.0 K/uL    RBC 4.92 3.80 - 5.20 M/uL    Hemoglobin 12.8 11.5 - 16.0 g/dL    Hematocrit 37.8 35.0 - 47.0 %    MCV 76.8 (L) 80.0 - 99.0 FL    MCH 26.0 26.0 - 34.0 PG    MCHC 33.9 30.0 - 36.5 g/dL    RDW 14.6 (H) 11.5 - 14.5 %    Platelets 215 150 - 400 K/uL    MPV 12.5 8.9 - 12.9 FL    Nucleated RBCs 0.0 0  WBC    nRBC 0.00 0.00 - 0.01 K/uL    Neutrophils % 65 32 - 75 %    Lymphocytes % 25 12 - 49 %    Monocytes % 8 5 - 13 %    Eosinophils % 1 0 - 7 %    Basophils % 0 0 - 1 %    Immature Granulocytes 1 (H) 0.0 - 0.5 %    Neutrophils Absolute 6.6 1.8 - 8.0 K/UL    Lymphocytes Absolute 2.5 0.8 - 3.5 K/UL    Monocytes Absolute 0.9 0.0 - 1.0 K/UL    Eosinophils Absolute 0.1 0.0 - 0.4 K/UL

## 2024-02-23 NOTE — H&P
effort  Back costovertebral angle tenderness: absent  Abdomen: soft, nontender  Fundus: firm and non tender  Perineum: blood absent, amniotic fluid present  Cervical Exam: 4 cm dilated    100% effaced    -3 station    Presenting Part: cephalic  Lower Extremities:  - Edema No  .  Membranes:  Spontaneous Rupture of Membranes; Amniotic Fluid: clear fluid  Fetal Heart Rate: Reactive  Contractions: every 2 min   Prenatal Labs:   A positive  GBS neg     Assessment/Plan:   32yo  term labor  Category 1 strip     Plan:   Admit for labor  Pain management: plans epidural   Expectant management  Anticipate vaginal delivery       Signed By:  CHRISTINE Young - LISA     2024

## 2024-02-24 PROCEDURE — 1120000000 HC RM PRIVATE OB

## 2024-02-24 PROCEDURE — 94761 N-INVAS EAR/PLS OXIMETRY MLT: CPT

## 2024-02-24 PROCEDURE — 6370000000 HC RX 637 (ALT 250 FOR IP): Performed by: OBSTETRICS & GYNECOLOGY

## 2024-02-24 RX ORDER — IBUPROFEN 800 MG/1
800 TABLET ORAL EVERY 6 HOURS PRN
Qty: 60 TABLET | Refills: 0 | Status: SHIPPED | OUTPATIENT
Start: 2024-02-24 | End: 2024-02-25 | Stop reason: HOSPADM

## 2024-02-24 RX ADMIN — DOCUSATE SODIUM 100 MG: 100 CAPSULE, LIQUID FILLED ORAL at 09:41

## 2024-02-24 RX ADMIN — ACETAMINOPHEN 1000 MG: 500 TABLET ORAL at 15:26

## 2024-02-24 RX ADMIN — IBUPROFEN 800 MG: 800 TABLET, FILM COATED ORAL at 15:26

## 2024-02-24 RX ADMIN — IBUPROFEN 800 MG: 800 TABLET, FILM COATED ORAL at 03:55

## 2024-02-24 RX ADMIN — ACETAMINOPHEN 1000 MG: 500 TABLET ORAL at 23:52

## 2024-02-24 RX ADMIN — IBUPROFEN 800 MG: 800 TABLET, FILM COATED ORAL at 23:52

## 2024-02-24 ASSESSMENT — PAIN - FUNCTIONAL ASSESSMENT: PAIN_FUNCTIONAL_ASSESSMENT: ACTIVITIES ARE NOT PREVENTED

## 2024-02-24 ASSESSMENT — PAIN DESCRIPTION - LOCATION
LOCATION: PERINEUM
LOCATION: ABDOMEN;BACK

## 2024-02-24 ASSESSMENT — PAIN DESCRIPTION - ORIENTATION
ORIENTATION: LOWER
ORIENTATION: LOWER

## 2024-02-24 ASSESSMENT — PAIN SCALES - GENERAL
PAINLEVEL_OUTOF10: 3
PAINLEVEL_OUTOF10: 5

## 2024-02-24 ASSESSMENT — PAIN DESCRIPTION - DESCRIPTORS
DESCRIPTORS: CRAMPING
DESCRIPTORS: SORE

## 2024-02-24 NOTE — PROGRESS NOTES
Post-Partum Day Number 1 Progress Note      Patient doing well post-partum without significant complaint.  Forceps assisted delivery.  Had guzman in x24hrs, out this AM.  She is voiding without difficulty, emptying her bladder completely.  She reports normal lochia. She is ambulatiing without dizziness.  Her pain is well controlled with oral pain medication. She is tolerating general diet.    Wants circ    Vitals:  Patient Vitals for the past 8 hrs:   BP Temp Temp src Pulse Resp SpO2   24 0818 120/80 97.7 °F (36.5 °C) Oral 71 16 97 %     Temp (24hrs), Av.5 °F (36.9 °C), Min:97.7 °F (36.5 °C), Max:99.2 °F (37.3 °C)        Exam:  Patient without distress.               Lungs: CTA bilaterally               CV: regular rate/rhythm, no rubs or gallops, no murmur               Abdomen soft, nontender, nondistended, normal bowel sounds               Uterus: fundus firm at level of umbilicus, nontender               Lower extremities are negative for cords or tenderness, 0-tr swelling.    Labs: No results found for this or any previous visit (from the past 24 hour(s)).    No components found for: \"OBEXTABORH\", \"OBEXTABSCRN\", \"OBEXTRUBELLA\", \"OBEXTGRBS\", \"OBEXTHBSAG\", \"OBEXTHIV\", \"OBEXTRPR\", \"OBEXTGONORR\", \"OBEXTCHLAM\"    Assessment and Plan:   Postpartum Day #1 S/P .  Doing well.   - routine care   - anticipate discharge in AM   - counseled R/B of circ, informed consent obtained.

## 2024-02-24 NOTE — LACTATION NOTE
This note was copied from a baby's chart.  Baby has been breastfeeding and formula feeding. Baby did latch on and breast fed well during LC visit.     Reviewed breastfeeding basics:  Supply and demand,  stomach size, early  Feeding cues, skin to skin, positioning and baby led latch-on, assymetrical latch with signs of good, deep latch vs shallow, feeding frequency and duration, and log sheet for tracking infant feedings and output.  Breastfeeding Booklet and Warm line information given.  Discussed typical  weight loss and the importance of infant weight checks with pediatrician 1-2 post discharge.       Discussed eating a healthy diet. Instructed mother to eat a variety of foods in order to get a well balanced diet. She should consume an extra 500 calories per day (more than her non-pregnant requirement.) These extra calories will help provide energy needed for optimal breast milk production. Mother also encouraged to \"drink to thirst\" and it is recommended that she drink fluids such as water, fruit/vegetable juice. Nutritious snacks should be available so that she can eat throughout the day to help satisfy her hunger and maintain a good milk supply.       Discussed what to do if she gets engorged or if her nipples become sore:    Engorgement Care Guidelines:  Reviewed how milk is made and normal phases of milk production.  Taught care of engorged breasts - physiologic breastfeeding encouraged with use of cool packs (no ice directly on skin). Consider use of NSAIDS where appropriate for discomfort and inflammation.      Care for sore/tender nipples discussed:  ways to improve positioning and latch practiced and discussed, hand express colostrum after feedings and let air dry, light application of lanolin, hydrogel pads, seek comfortable laid back feeding position, start feedings on least sore side first.       Mother given a [employer] hand pump - instructions for use given. Reviewed storage and

## 2024-02-24 NOTE — DISCHARGE SUMMARY
Obstetrical Discharge Summary     Name: Louise Galicia MRN: [id number]  SSN: [id number]    YOB: 1990  Age: 33 y.o.  Sex: female      Admit Date: 2/22/2024    Discharge Date: 2/25/2024     Admitting Physician: Vitaly Talamantes MD     Attending Physician:  Kirk Sandhu II, MD     Admission Diagnoses: Normal labor [O80, Z37.9]  Discharge Diagnoses:   Normal labor [O80, Z37.9]    Additional Diagnoses: Principal Problem:    Normal labor  Resolved Problems:    * No resolved hospital problems. *       Immunization(s):   Immunization History   Administered Date(s) Administered    TDaP, ADACEL (age 10y-64y), BOOSTRIX (age 10y+), IM, 0.5mL 12/01/2023        Delivery Information:  Delivery Type: Vaginal, Forceps      By: VITALY TALAMANTES    On: 2/23/2024   at: 5:49 AM  '     Hospital Course: Normal hospital course following the delivery.  The patient was released to her home in good condition.    Patient Instructions:     Reference my discharge instructions.    Current Discharge Medication List        START taking these medications    Details   ibuprofen (ADVIL;MOTRIN) 800 MG tablet Take 1 tablet by mouth every 6 hours as needed for Pain  Qty: 60 tablet, Refills: 0  Start date: 2/24/2024, End date: 3/25/2024           CONTINUE these medications which have NOT CHANGED    Details   Prenatal Vit-Fe Fumarate-FA (PRENATAL VITAMINS PO) Take by mouth           STOP taking these medications       triamcinolone (ARISTOCORT) 0.5 % ointment Comments:   Reason for Stopping:               I spent 10 minutes discharging the patient in face to face contact.    Follow-up Information       Follow up With Specialties Details Why Contact Info    Kirk Sandhu II, MD Obstetrics & Gynecology, Gynecology, Obstetrics Follow up in 6 week(s) Post Partum [address] [address] [address] [phone]                 Signed By:  Kirk Sandhu MD     February 24, 2024

## 2024-02-24 NOTE — ANESTHESIA POSTPROCEDURE EVALUATION
Department of Anesthesiology  Postprocedure Note    Patient: Louise Galicia  MRN: [id number]  YOB: 1990  Date of evaluation: 2024    Procedure Summary       Date: 24 Room / Location: [facility] L&D OR    Anesthesia Start: 520 Anesthesia Stop: 630    Procedure:  SECTION Diagnosis:       Failure to progress in second stage of labor      (Failure to progress in second stage of labor [O62.2])    Surgeons: Vitaly Talamantes MD Responsible Provider: Martin Stephenson MD    Anesthesia Type: Spinal ASA Status: 2 - Emergent            Anesthesia Type: Spinal    Kevin Phase I:      Kevin Phase II:      Anesthesia Post Evaluation    Patient location during evaluation: floor  Patient participation: complete - patient participated  Airway patency: patent  Nausea & Vomiting: no nausea  Cardiovascular status: blood pressure returned to baseline  Respiratory status: acceptable  Hydration status: euvolemic  Multimodal analgesia pain management approach    No notable events documented.

## 2024-02-25 ENCOUNTER — TELEPHONE (OUTPATIENT)
Age: 34
End: 2024-02-25

## 2024-02-25 VITALS
SYSTOLIC BLOOD PRESSURE: 130 MMHG | OXYGEN SATURATION: 99 % | DIASTOLIC BLOOD PRESSURE: 89 MMHG | HEART RATE: 75 BPM | TEMPERATURE: 97.5 F | RESPIRATION RATE: 16 BRPM

## 2024-02-25 LAB
APPEARANCE UR: CLEAR
BACTERIA URNS QL MICRO: NEGATIVE /HPF
BILIRUB UR QL: NEGATIVE
COLOR UR: ABNORMAL
EPITH CASTS URNS QL MICRO: ABNORMAL /LPF
GLUCOSE UR STRIP.AUTO-MCNC: NEGATIVE MG/DL
HGB UR QL STRIP: ABNORMAL
KETONES UR QL STRIP.AUTO: NEGATIVE MG/DL
LEUKOCYTE ESTERASE UR QL STRIP.AUTO: ABNORMAL
NITRITE UR QL STRIP.AUTO: NEGATIVE
PH UR STRIP: 7 (ref 5–8)
PROT UR STRIP-MCNC: NEGATIVE MG/DL
RBC #/AREA URNS HPF: ABNORMAL /HPF (ref 0–5)
SP GR UR REFRACTOMETRY: 1 (ref 1–1.03)
UROBILINOGEN UR QL STRIP.AUTO: 0.2 EU/DL (ref 0.2–1)
WBC URNS QL MICRO: ABNORMAL /HPF (ref 0–4)

## 2024-02-25 PROCEDURE — 6360000002 HC RX W HCPCS: Performed by: OBSTETRICS & GYNECOLOGY

## 2024-02-25 PROCEDURE — 6370000000 HC RX 637 (ALT 250 FOR IP): Performed by: OBSTETRICS & GYNECOLOGY

## 2024-02-25 PROCEDURE — 90686 IIV4 VACC NO PRSV 0.5 ML IM: CPT | Performed by: OBSTETRICS & GYNECOLOGY

## 2024-02-25 PROCEDURE — G0008 ADMIN INFLUENZA VIRUS VAC: HCPCS | Performed by: OBSTETRICS & GYNECOLOGY

## 2024-02-25 PROCEDURE — 87086 URINE CULTURE/COLONY COUNT: CPT

## 2024-02-25 PROCEDURE — 81001 URINALYSIS AUTO W/SCOPE: CPT

## 2024-02-25 RX ORDER — IBUPROFEN 600 MG/1
600 TABLET ORAL EVERY 6 HOURS PRN
Qty: 30 TABLET | Refills: 1 | Status: SHIPPED | OUTPATIENT
Start: 2024-02-25

## 2024-02-25 RX ADMIN — INFLUENZA VIRUS VACCINE 0.5 ML: 15; 15; 15; 15 SUSPENSION INTRAMUSCULAR at 15:56

## 2024-02-25 RX ADMIN — IBUPROFEN 800 MG: 800 TABLET, FILM COATED ORAL at 15:42

## 2024-02-25 RX ADMIN — ACETAMINOPHEN 1000 MG: 500 TABLET ORAL at 15:41

## 2024-02-25 RX ADMIN — DOCUSATE SODIUM 100 MG: 100 CAPSULE, LIQUID FILLED ORAL at 08:01

## 2024-02-25 RX ADMIN — ACETAMINOPHEN 1000 MG: 500 TABLET ORAL at 08:01

## 2024-02-25 RX ADMIN — IBUPROFEN 800 MG: 800 TABLET, FILM COATED ORAL at 08:01

## 2024-02-25 NOTE — PROGRESS NOTES
Urine result called to Dr Salamanca. Per Dr Salamanca, no meds needed and ok to discharge patient to home.

## 2024-02-25 NOTE — DISCHARGE INSTRUCTIONS
F/u with scheduled date:    Por favor, nawaf un seguimiento con [place] pediatra para esta fecha: ____________    f/u without scheduled date:    Por favor, nawaf un seguimiento con [place] pediatra en ___________________ follow            El período posparto: Instrucciones de cuidado-Instrucciones de cuidado  Postpartum: Care Instructions  Instrucciones de cuidado  Después del parto (período posparto), [place] cuerpo pasa por muchos cambios. Algunos de estos cambios suceden jean-pierre varias semanas. Jena-Pierre las horas posteriores al parto, el cuerpo comienza a recuperarse y se prepara para el amamantamiento. Es posible que jean-pierre jl tiempo se sienta sensible. Las hormonas pueden cambiar [place] estado de ánimo sin advertencia y sin motivo aparente.  Jean-Pierre las primeras semanas después del parto, muchas mujeres tienen emociones que cambian de macias a juan. Es posible que le resulte difícil dormir. Es posible que llore mucho. [place] se llama \"melancolía de la maternidad\". Estas emociones abrumadoras suelen desaparecer dentro de unos días o semanas. Elva es importante hablar con [place] médico acerca de anthony sentimientos.  Jean-Pierre las primeras semanas después del parto, es fácil cansarse demasiado o sentirse abrumada. No nawaf demasiados esfuerzos. Descanse cada vez que pueda, acepte la ayuda de los demás, coma joleen y randell abundantes líquidos.  En el primer par de semanas después de jorge a elisha, es posible que [place] médico o partera deseen verla y hacer un plan para cualquier atención de seguimiento que usted pueda necesitar. Probablemente tendrá braydon alan completa de posparto dentro de los primeros 3 meses después del parto. En shaylee momento, [place] médico o partera revisarán [place] recuperación del parto. Él o ruben también verán cómo está enfrentando usted anthony emociones y conversarán sobre anthony inquietudes y preguntas.  La atención de seguimiento es braydon parte clave de [place] tratamiento y seguridad. Asegúrese de hacer y acudir a todas las citas, y llame a [place] médico

## 2024-02-25 NOTE — PROGRESS NOTES
Post-Partum Day Number 2 Progress/Discharge Note    Patient doing well post-partum.  She has some burning with urination. she reports normal lochia. She is ambulatiing without dizziness.  Her pain is well controlled with oral pain medication. She is tolerating general diet.  Breast feeding, has some bleeding from nipples.        Vitals:  Patient Vitals for the past 8 hrs:   BP Temp Temp src Pulse Resp SpO2   24 0808 130/89 97.5 °F (36.4 °C) Oral 75 16 99 %     Temp (24hrs), Av °F (36.7 °C), Min:97.5 °F (36.4 °C), Max:98.4 °F (36.9 °C)        Exam:  Patient without distress.               Lungs:  CTA bilaterally               CV: RRR with no rubs or gallops; no murmur               Abdomen soft, fundus firm at level of umbilicus, nontender               Lower extremities are negative for cords or tenderness; tr swelling.    Labs: No results found for this or any previous visit (from the past 24 hour(s)).    No components found for: \"OBEXTABORH\", \"OBEXTABSCRN\", \"OBEXTRUBELLA\", \"OBEXTGRBS\", \"OBEXTHBSAG\", \"OBEXTHIV\", \"OBEXTRPR\", \"OBEXTGONORR\", \"OBEXTCHLAM\"    Assessment and Plan:    Postpartum Day #2, S/P .  Doing well.   - d/c home   - F/U 6wk postpartum check, sooner prn   - will send message to Dr. Sandhu to send Anjel Callejas's in AM when compounding pharmacies are open.   - burning with urination most likely d/t irritation from guzman.  Will send UA/C&S

## 2024-02-26 LAB
BACTERIA SPEC CULT: NORMAL
SERVICE CMNT-IMP: NORMAL

## 2024-03-28 NOTE — PROGRESS NOTES
Louise Galicia is a 33 y.o. female returns for a routine post-partum follow-up visit     Chief Complaint   Patient presents with    Postpartum Care       Postpartum Depression: Medium Risk (2024)    [place]  Depression Scale     Last EPDS Total Score: 6     Last EPDS Self Harm Result: Never       Current EPDS score:2      Type of delivery: normal spontaneous vaginal delivery  Date of Delivery: 2024  Breastfeeding: yes  Bleeding Resolved: yes  Birth Control: discuss.  Last Pap: normal obtained 2023.        Problems: no problems    1. Have you been to the ER, urgent care clinic, or hospitalized since your last visit? No    2. Have you seen or consulted any other health care providers outside of the [facility] since your last visit? No    Examination chaperoned by LORE DOMINGUEZ CMA.

## 2024-03-29 ENCOUNTER — POSTPARTUM VISIT (OUTPATIENT)
Age: 34
End: 2024-03-29
Payer: MEDICAID

## 2024-03-29 VITALS
SYSTOLIC BLOOD PRESSURE: 129 MMHG | DIASTOLIC BLOOD PRESSURE: 72 MMHG | WEIGHT: 143.4 LBS | HEIGHT: 60 IN | BODY MASS INDEX: 28.16 KG/M2

## 2024-03-29 PROCEDURE — 0503F POSTPARTUM CARE VISIT: CPT | Performed by: OBSTETRICS & GYNECOLOGY

## 2024-03-29 NOTE — PROGRESS NOTES
POSTPARTUM VAGINAL DELIVERY     Louise Galicia is 33 y.o. year old  /   female who presents for a routine postpartum follow-up visit following a Vaginal, Forceps  delivery on 2024  with Spinal  anesthesia by GINGER TALAMANTES .  She had the following lacerations: 2nd .    Her baby is doing well.    She has had no menses since delivery.     She has had the following significant problems since her delivery: none    The patient is breast feeding without difficulty.     The patient would like to use IUD for birth control.     She is currently taking:   This patient does not have an active medication from one of the medication groupers.     EPDS score is: 4    She is due for her next pap in 4 months.     Problem List:  Patient Active Problem List    Diagnosis Date Noted    Normal labor 2024    Language barrier 2023     Overview Note:     [place]ian       No past medical history on file.  Past Surgical History:   Procedure Laterality Date     SECTION N/A 2024     SECTION performed by Ginger Talamantes MD at [facility] L&D OR       OB History    Para Term  AB Living   2 2 2 0 0 2   SAB IAB Ectopic Molar Multiple Live Births   0 0 0 0 0 2      # Outcome Date GA Lbr Telly/2nd Weight Sex Delivery Anes PTL Lv   2 Term 24 40w1d  3.35 kg (7 lb 6.2 oz) M Vag-Forceps Spinal N JEANNETTE      Complications: Failure to Progress in Second Stage      Name: Mark      Apgar1: 9  Apgar5: 9   1 Term 10/12/17 40w0d  2.8 kg (6 lb 2.8 oz) F Vag-Spont EPI  JEANNETTE     Social History     Socioeconomic History    Marital status: Single     Spouse name: Braxton    Number of children: 1   Occupational History    Occupation: Homemaker   Tobacco Use    Smoking status: Never     Passive exposure: Never    Smokeless tobacco: Never   Substance and Sexual Activity    Alcohol use: Not Currently    Drug use: Never    Sexual activity: Yes     Partners: Male     Birth

## 2024-04-11 NOTE — PROGRESS NOTES
Louise Galicia is a 33 y.o. female presents for a problem visit.    Chief Complaint   Patient presents with    Procedure     IUD insertion              No LMP recorded.    Birth Control: abstinence.    Last Pap: normal obtained 7/24/2023.        1. Have you been to the ER, urgent care clinic, or hospitalized since your last visit? No    2. Have you seen or consulted any other health care providers outside of the [facility] since your last visit? No    Device number [id number], expiration date 06/30/2026    Chart reviewed for the following:   ALBERTO WHEAT MARGARET PATTIE, CMA, have reviewed the History, Physical and updated the Allergic reactions for Louise Galicia     TIME OUT performed immediately prior to start of procedure:   ALBERTO WHEAT MARGARET PATTIE, CMA, have performed the following reviews on Louise Galicia prior to the start of the procedure:            * Patient was identified by name and date of birth   * Agreement on procedure being performed was verified  * Risks and Benefits explained to the patient  * Procedure site verified and marked as necessary  * Patient was positioned for comfort  * Consent was signed and verified     Time: 10:35am    Date of procedure: 4/12/2024    Procedure performed by:  Kirk Sandhu MD       Provider assisted by: Christie Tobin MA    Patient assisted by: self    How tolerated by patient: tolerated the procedure well with no complications    Post Procedural Pain Scale: 2 - Hurts Little Bit    Comments: none    Examination chaperoned by LORE TOBIN CMA.

## 2024-04-12 ENCOUNTER — PROCEDURE VISIT (OUTPATIENT)
Age: 34
End: 2024-04-12
Payer: MEDICAID

## 2024-04-12 VITALS
WEIGHT: 142 LBS | BODY MASS INDEX: 27.88 KG/M2 | HEIGHT: 60 IN | DIASTOLIC BLOOD PRESSURE: 73 MMHG | SYSTOLIC BLOOD PRESSURE: 131 MMHG

## 2024-04-12 DIAGNOSIS — Z30.430 ENCOUNTER FOR INSERTION OF MIRENA IUD: Primary | ICD-10-CM

## 2024-04-12 PROBLEM — Z37.9 NORMAL LABOR: Status: RESOLVED | Noted: 2024-02-23 | Resolved: 2024-04-12

## 2024-04-12 LAB
HCG, PREGNANCY, URINE, POC: NEGATIVE
VALID INTERNAL CONTROL, POC: YES

## 2024-04-12 PROCEDURE — 58300 INSERT INTRAUTERINE DEVICE: CPT | Performed by: OBSTETRICS & GYNECOLOGY

## 2024-04-12 PROCEDURE — 81025 URINE PREGNANCY TEST: CPT | Performed by: OBSTETRICS & GYNECOLOGY

## 2024-04-12 NOTE — PROGRESS NOTES
Mirena IUD INSERTION    Indications:  Louise Galicia is 33 y.o. year old  /   female who presents for insertion of an IUD today.   No LMP recorded.     The risks, benefits and alternatives of IUD insertion were discussed in detail at last visit.  She also has reviewed Mirena information. She has elected to proceed with the insertion today and she states she has no further questions.    Labs  Recent Results (from the past 24 hour(s))   AMB POC URINE PREGNANCY TEST, VISUAL COLOR COMPARISON    Collection Time: 24 10:35 AM   Result Value Ref Range    Valid Internal Control, POC yes     HCG, Pregnancy, Urine, POC Negative        Procedure:  The pelvic exam revealed normal external genitalia. On bimanual exam the uterus was anteverted and normal in size with no tenderness present.   A speculum was inserted into the vagina and the cervix was visualized.   The cervix was prepped with zephiran solution.   The anterior lip of the cervix was grasped with a single toothed tenaculum.   The uterus was sounded with a Sure Sound to 9 centimeters.   The Mirena IUD was then inserted without difficulty.   The string was cut to 3 centimeters.She experienced a mild  amount of cramping.     Post Procedure Status:  She tolerated the procedure with mild discomfort.   The patient was observed for 5 minutes after the insertion.   There were no complications.     Patient was discharged in stable condition.    The patient received Mirena lot number [id number], expiration date 2026 .    ASSESSMENT   Diagnosis Orders   1. Encounter for insertion of mirena IUD  AMB POC URINE PREGNANCY TEST, VISUAL COLOR COMPARISON        PLAN  Return in about 1 month (around 2024) for IUD Check.

## 2024-05-15 NOTE — PROGRESS NOTES
Louise Galicia is a 33 y.o. female presents for a problem visit.    Chief Complaint   Patient presents with    IUD check     No LMP recorded. (Menstrual status: IUD).      The patient is reporting having: IUD check, IUD placed 4/12/2024- patient denies any pain with IUD      Examination chaperoned by Juanita Yang MA.

## 2024-05-17 ENCOUNTER — OFFICE VISIT (OUTPATIENT)
Age: 34
End: 2024-05-17

## 2024-05-17 VITALS — SYSTOLIC BLOOD PRESSURE: 117 MMHG | BODY MASS INDEX: 28 KG/M2 | DIASTOLIC BLOOD PRESSURE: 76 MMHG | WEIGHT: 143 LBS

## 2024-05-17 DIAGNOSIS — Z30.431 IUD CHECK UP: Primary | ICD-10-CM

## 2024-05-17 SDOH — ECONOMIC STABILITY: HOUSING INSECURITY
IN THE LAST 12 MONTHS, WAS THERE A TIME WHEN YOU DID NOT HAVE A STEADY PLACE TO SLEEP OR SLEPT IN A SHELTER (INCLUDING NOW)?: PATIENT DECLINED

## 2024-05-17 SDOH — ECONOMIC STABILITY: FOOD INSECURITY: WITHIN THE PAST 12 MONTHS, YOU WORRIED THAT YOUR FOOD WOULD RUN OUT BEFORE YOU GOT MONEY TO BUY MORE.: PATIENT DECLINED

## 2024-05-17 SDOH — ECONOMIC STABILITY: INCOME INSECURITY: HOW HARD IS IT FOR YOU TO PAY FOR THE VERY BASICS LIKE FOOD, HOUSING, MEDICAL CARE, AND HEATING?: PATIENT DECLINED

## 2024-05-17 SDOH — ECONOMIC STABILITY: FOOD INSECURITY: WITHIN THE PAST 12 MONTHS, THE FOOD YOU BOUGHT JUST DIDN'T LAST AND YOU DIDN'T HAVE MONEY TO GET MORE.: PATIENT DECLINED

## 2024-05-17 ASSESSMENT — PATIENT HEALTH QUESTIONNAIRE - PHQ9
2. FEELING DOWN, DEPRESSED OR HOPELESS: NOT AT ALL
SUM OF ALL RESPONSES TO PHQ QUESTIONS 1-9: 0
SUM OF ALL RESPONSES TO PHQ QUESTIONS 1-9: 0
1. LITTLE INTEREST OR PLEASURE IN DOING THINGS: NOT AT ALL
SUM OF ALL RESPONSES TO PHQ QUESTIONS 1-9: 0
SUM OF ALL RESPONSES TO PHQ9 QUESTIONS 1 & 2: 0
SUM OF ALL RESPONSES TO PHQ QUESTIONS 1-9: 0

## 2024-05-17 NOTE — PROGRESS NOTES
IUD followup note    This is a follow-up visit for Louise Martimargie Galicia is a ,  33 y.o. female  /  No LMP recorded. (Menstrual status: IUD)..     She had an Mirena IUD placed 4 weeks ago.  She has been doing well since the insertion    Past Medical History:   Diagnosis Date    IUD (intrauterine device) in place 2024    Mirena     History reviewed. No pertinent surgical history.  Social History     Occupational History    Occupation: Homemaker   Tobacco Use    Smoking status: Never     Passive exposure: Never    Smokeless tobacco: Never   Substance and Sexual Activity    Alcohol use: Not Currently    Drug use: Never    Sexual activity: Yes     Partners: Male     Birth control/protection: None     Family History   Problem Relation Age of Onset    Heart Attack Father     Kidney Disease Father        Allergies   Allergen Reactions    Strawberry Rash     Prior to Admission medications    Not on File        Review of Systems: History obtained from the patient  Constitutional: negative for weight loss, fever, night sweats  Breast: negative for breast lumps, nipple discharge, galactorrhea  GI: negative for change in bowel habits, abdominal pain, black or bloody stools  : negative for frequency, dysuria, hematuria, vaginal discharge  MSK: negative for back pain, joint pain, muscle pain  Skin: negative for itching, rash, hives  Psych: negative for anxiety, depression, change in mood      Objective:  /76   Wt 64.9 kg (143 lb)   BMI 28.00 kg/m²     Physical Exam:   PHYSICAL EXAMINATION    Constitutional  Appearance: well-nourished, well developed, alert, in no acute distress    Gastrointestinal  Abdominal Examination: abdomen non-tender to palpation, normal bowel sounds, no masses present  Liver and spleen: no hepatomegaly present, spleen not palpable  Hernias: no hernias identified    Genitourinary  External Genitalia: normal appearance for age, no discharge present, no tenderness

## (undated) DEVICE — SYRINGE IRRIG 60ML SFT PLIABLE BLB EZ TO GRP 1 HND USE W/

## (undated) DEVICE — TOWEL,OR,DSP,ST,BLUE,STD,2/PK,40PK/CS: Brand: MEDLINE

## (undated) DEVICE — SOLIDIFIER FLD 3.2OZ 3000CC TRAD IN BTL LIQUI-LOC

## (undated) DEVICE — INTENT OT USE PROVIDES A STERILE INTERFACE BETWEEN THE OPERATING ROOM SURGICAL LAMPS (NON-STERILE) AND THE SURGEON OR STAFF WORKING IN THE STERILE FIELD.: Brand: ASPEN® ALC PLUS LIGHT HANDLE COVER

## (undated) DEVICE — CLEANER ES TIP W2XL2IN ADH BK RADPQ FOR S STL ELECTRD

## (undated) DEVICE — GLOVE ORANGE PI 8   MSG9080

## (undated) DEVICE — CANISTER, RIGID, 3000CC: Brand: MEDLINE INDUSTRIES, INC.

## (undated) DEVICE — ELECTRODE PT RET AD L9FT HI MOIST COND ADH HYDRGEL CORDED

## (undated) DEVICE — SUTURE ABSORBABLE MONOFILAMENT 0 CTX 60 IN VIO PDS + PDP990G

## (undated) DEVICE — SUTURE MCRYL SZ 4 0 L18IN ABSRB VLT PS 1 L24MM 3 8 CIR REV Y682H

## (undated) DEVICE — STRAP,POSITIONING,KNEE/BODY,FOAM,4X60": Brand: MEDLINE

## (undated) DEVICE — TOTAL TRAY, 16FR 10ML SIL FOLEY, URN: Brand: MEDLINE

## (undated) DEVICE — SUTURE VCRL + SZ 2-0 L36IN ABSRB UD L36MM CT-1 1/2 CIR VCP945H

## (undated) DEVICE — APPLICATOR MEDICATED 26 CC SOLUTION HI LT ORNG CHLORAPREP

## (undated) DEVICE — ADHESIVE SKIN CLSR 0.7ML TOP DERMBND ADV

## (undated) DEVICE — GOWN,SIRUS,FABRNF,XL,20/CS: Brand: MEDLINE

## (undated) DEVICE — PENCIL ES L3M ROCK SWCH S STL HEX LOK BLDE ELECTRD HOLSTER

## (undated) DEVICE — C-SECTION-SFMC: Brand: MEDLINE INDUSTRIES, INC.

## (undated) DEVICE — SUTURE VCRL + SZ 0 L36IN ABSRB VLT L40MM CT 1/2 CIR TAPR VCP358H

## (undated) DEVICE — SOLUTION IRRIG 1000ML 0.9% SOD CHL USP POUR PLAS BTL